# Patient Record
Sex: MALE | Race: BLACK OR AFRICAN AMERICAN | Employment: FULL TIME | URBAN - METROPOLITAN AREA
[De-identification: names, ages, dates, MRNs, and addresses within clinical notes are randomized per-mention and may not be internally consistent; named-entity substitution may affect disease eponyms.]

---

## 2017-06-05 ENCOUNTER — OFFICE VISIT (OUTPATIENT)
Dept: INTERNAL MEDICINE | Age: 57
End: 2017-06-05
Attending: STUDENT IN AN ORGANIZED HEALTH CARE EDUCATION/TRAINING PROGRAM

## 2017-06-05 VITALS
WEIGHT: 193 LBS | BODY MASS INDEX: 26.18 KG/M2 | RESPIRATION RATE: 20 BRPM | TEMPERATURE: 97.6 F | HEART RATE: 66 BPM | DIASTOLIC BLOOD PRESSURE: 112 MMHG | OXYGEN SATURATION: 99 % | SYSTOLIC BLOOD PRESSURE: 181 MMHG

## 2017-06-05 DIAGNOSIS — N18.30 CKD (CHRONIC KIDNEY DISEASE) STAGE 3, GFR 30-59 ML/MIN (HCC): ICD-10-CM

## 2017-06-05 DIAGNOSIS — I10 ESSENTIAL HYPERTENSION: Primary | ICD-10-CM

## 2017-06-05 RX ORDER — AMLODIPINE BESYLATE 5 MG/1
5 TABLET ORAL DAILY
Qty: 30 TABLET | Refills: 2 | Status: SHIPPED | OUTPATIENT
Start: 2017-06-05 | End: 2017-06-26 | Stop reason: SDUPTHER

## 2017-06-06 LAB — TSH REFLEX: 0.81 UIU/ML (ref 0.27–4.2)

## 2017-06-06 ASSESSMENT — ENCOUNTER SYMPTOMS
BLURRED VISION: 0
VOMITING: 0
NAUSEA: 0
SORE THROAT: 0
ORTHOPNEA: 0
SHORTNESS OF BREATH: 0
EYE DISCHARGE: 0
STRIDOR: 0
DIARRHEA: 0
DOUBLE VISION: 0
ABDOMINAL PAIN: 0
COUGH: 0
SPUTUM PRODUCTION: 0
BACK PAIN: 0
PHOTOPHOBIA: 0

## 2017-06-26 ENCOUNTER — OFFICE VISIT (OUTPATIENT)
Dept: INTERNAL MEDICINE | Age: 57
End: 2017-06-26
Attending: STUDENT IN AN ORGANIZED HEALTH CARE EDUCATION/TRAINING PROGRAM

## 2017-06-26 VITALS
TEMPERATURE: 97.9 F | HEART RATE: 85 BPM | RESPIRATION RATE: 20 BRPM | SYSTOLIC BLOOD PRESSURE: 175 MMHG | BODY MASS INDEX: 26.12 KG/M2 | OXYGEN SATURATION: 98 % | DIASTOLIC BLOOD PRESSURE: 104 MMHG | WEIGHT: 192.6 LBS

## 2017-06-26 DIAGNOSIS — N52.9 ERECTILE DYSFUNCTION, UNSPECIFIED ERECTILE DYSFUNCTION TYPE: Primary | ICD-10-CM

## 2017-06-26 RX ORDER — AMLODIPINE BESYLATE 5 MG/1
5 TABLET ORAL 2 TIMES DAILY
Qty: 30 TABLET | Refills: 2 | Status: SHIPPED | OUTPATIENT
Start: 2017-06-26 | End: 2017-06-26 | Stop reason: SDUPTHER

## 2017-06-26 RX ORDER — AMLODIPINE BESYLATE 5 MG/1
5 TABLET ORAL 2 TIMES DAILY
Qty: 60 TABLET | Refills: 2 | Status: SHIPPED | OUTPATIENT
Start: 2017-06-26 | End: 2018-01-08 | Stop reason: SDUPTHER

## 2017-06-26 ASSESSMENT — ENCOUNTER SYMPTOMS
DIARRHEA: 0
SORE THROAT: 0
PHOTOPHOBIA: 0
ORTHOPNEA: 0
COUGH: 0
VOMITING: 0
SPUTUM PRODUCTION: 0
BLURRED VISION: 0
SHORTNESS OF BREATH: 0
ABDOMINAL PAIN: 0
DOUBLE VISION: 0
EYE DISCHARGE: 0
BACK PAIN: 0
STRIDOR: 0
NAUSEA: 0

## 2017-12-22 ENCOUNTER — TELEPHONE (OUTPATIENT)
Dept: INTERNAL MEDICINE | Age: 57
End: 2017-12-22

## 2017-12-22 NOTE — TELEPHONE ENCOUNTER
Pt seen as an er followup in June was supposed to return for another follow up visit in one month but did not. PT was given amlodidpine for htn at that time with 2 refills. Calling wants refill again on amlodipine but has not taken medication since August. Pt offered an appointment in clinic but did not want to make an appointment now states he will call back after the new year. Pt advised to go to an urgent care to have hIs bp checked and get medication if indicated or go to an er if he needed since he did not want to make a clinic appointment. Pt repeated back correrct instructions.

## 2018-01-08 ENCOUNTER — OFFICE VISIT (OUTPATIENT)
Dept: INTERNAL MEDICINE | Age: 58
End: 2018-01-08
Attending: STUDENT IN AN ORGANIZED HEALTH CARE EDUCATION/TRAINING PROGRAM

## 2018-01-08 VITALS
WEIGHT: 200 LBS | HEIGHT: 72 IN | SYSTOLIC BLOOD PRESSURE: 160 MMHG | RESPIRATION RATE: 16 BRPM | TEMPERATURE: 97.3 F | DIASTOLIC BLOOD PRESSURE: 98 MMHG | BODY MASS INDEX: 27.09 KG/M2 | OXYGEN SATURATION: 96 % | HEART RATE: 80 BPM

## 2018-01-08 DIAGNOSIS — I10 HYPERTENSION, UNSPECIFIED TYPE: Primary | ICD-10-CM

## 2018-01-08 RX ORDER — AMLODIPINE BESYLATE 5 MG/1
5 TABLET ORAL 2 TIMES DAILY
Qty: 60 TABLET | Refills: 5 | Status: SHIPPED | OUTPATIENT
Start: 2018-01-08 | End: 2018-02-12 | Stop reason: SDUPTHER

## 2018-01-08 RX ORDER — ACETAMINOPHEN 325 MG/1
650 TABLET ORAL EVERY 6 HOURS PRN
COMMUNITY

## 2018-01-08 ASSESSMENT — ENCOUNTER SYMPTOMS
EYE DISCHARGE: 0
ABDOMINAL PAIN: 0
DIARRHEA: 0
NAUSEA: 0
DOUBLE VISION: 0
ORTHOPNEA: 0
VOMITING: 0
SORE THROAT: 0
COUGH: 0
STRIDOR: 0
SHORTNESS OF BREATH: 0
BACK PAIN: 0
BLURRED VISION: 0
PHOTOPHOBIA: 0
SPUTUM PRODUCTION: 0

## 2018-01-08 NOTE — PROGRESS NOTES
results found for: CHOL, HDL, LDLCALC, TRIG    U/A:No results found for: LABMICR, [id number]    Imaging:   No results found. Assessment/Plan:   The patient is a 56 y.o. male with no significant PMHx who presents for a follow up visit after being seen in the ED on 6/2/17 ( his 3rd visit today). HTN. Patient seen in the ED on 6/2 and started on Coreg 6.25 BID. During following clinic visit patient found to have BP of 181/112. EKG was ordered (6/5) did not demonstrate any clear LVH or acute ST/T wave changes (patient was bradycardic likely due to BB). TSH was within normal limits ( 0.81). He was started on Norvasc 5mg daily (stopped coreg). During next visit (6/27) patient blood pressure noted to be 175/104, with HR of 85. Patient was asymptomatic.     - Was prescribed Norvac to 5mg BID PO daily last visit in June but never followed up. Patient was found to have elevated creatinine so at the time held off on ACE/thiazide until a repeat renal panel was obtained. Unfortunately patient only returned today. Reports BP at home was much improved. In clinic his sBP was 160/98. - Will refill Norvasc as patient reports it works for him, patient informed he should find a PCP who is convenient for him. Informed he should have BP monitored. He was also informed he should have a renal panel to monitor renal function.   - Low salt diet advised  - Again encouraged to measure and record blood pressure at home     Suspected CKD, stage 3. Patient found to have Cr of 1.5 on 6/2/17 in ED. BUN was 12. Likely related to hypertension. JANINE also possible but less likely with normal BUN and patient reporting no change in PO intake. - Repeat renal panel ordered, but patient did not have lab work. - Avoiding nephrotoxic medication  - BP control   - Asked to have renal panel checked with new PCP. Erectile dysfunction. Resolved. - Patient reports this is no longer an issue    Overweight.  BMI of 27.2  -Again recommended patient increase exercise activity and avoid junk food      Tobacco use disorder. Reports 1 PPD for 10-11 years.   - Recommended patient stop smoking as it may exacerbate his medical problems. Patient did not express a willingness to quite at this time. Neck Pain. Resolved. Was likely MSK in nature. Patient was informed he will need to find a new primary care physician who he would be able to see on a regular basis and have blood pressure and renal function carefully monitored. If any concerns patient was asked to go to ED. Case will be discussed with preceptor.      Shanta Douglas MD  Internal Medicine Resident  Pager: [phone]  1/8/2018, 10:49 AM

## 2018-01-08 NOTE — PATIENT INSTRUCTIONS
Call your pharmacy for medication refills at least 48 hours ahead at [phone] (Monday -Friday, 08:00 AM to 4:00 PM .If you become ill when the clinic is closed, please call [facility]  at [phone] and ask the  to page the AOD between 6:00 AM and 6:00 PM or page the AON between 6:00 PM & 6:00 AM.    The clinic is not able to process [facility] requests for appointments or messages including refill requests. Please call the [facility] at [phone] for appointments and messages. The [facility]  Telephone:[phone]    Refill Amlodipine 5 mg take one tablet by mouth 2 times per day. Refills x5. Call your insurance for referral for Private primary care doctor. Instructions reviewed with patient and copy given to patient upon discharge.      10:41 AM1/8/2018

## 2018-02-12 RX ORDER — AMLODIPINE BESYLATE 5 MG/1
5 TABLET ORAL 2 TIMES DAILY
Qty: 60 TABLET | Refills: 2 | Status: SHIPPED | OUTPATIENT
Start: 2018-02-12 | End: 2018-10-08 | Stop reason: SDUPTHER

## 2018-10-08 ENCOUNTER — OFFICE VISIT (OUTPATIENT)
Dept: INTERNAL MEDICINE CLINIC | Age: 58
End: 2018-10-08

## 2018-10-08 VITALS
TEMPERATURE: 96.9 F | HEIGHT: 72 IN | BODY MASS INDEX: 28.01 KG/M2 | RESPIRATION RATE: 20 BRPM | HEART RATE: 67 BPM | SYSTOLIC BLOOD PRESSURE: 152 MMHG | OXYGEN SATURATION: 99 % | DIASTOLIC BLOOD PRESSURE: 99 MMHG | WEIGHT: 206.8 LBS

## 2018-10-08 DIAGNOSIS — I10 HYPERTENSION, UNSPECIFIED TYPE: Primary | ICD-10-CM

## 2018-10-08 PROCEDURE — 99213 OFFICE O/P EST LOW 20 MIN: CPT | Performed by: STUDENT IN AN ORGANIZED HEALTH CARE EDUCATION/TRAINING PROGRAM

## 2018-10-08 RX ORDER — AMLODIPINE BESYLATE 5 MG/1
5 TABLET ORAL 2 TIMES DAILY
Qty: 60 TABLET | Refills: 2 | Status: ON HOLD | OUTPATIENT
Start: 2018-10-08 | End: 2018-12-08 | Stop reason: HOSPADM

## 2018-10-08 ASSESSMENT — ENCOUNTER SYMPTOMS
ABDOMINAL PAIN: 0
SORE THROAT: 0
DIARRHEA: 0
BACK PAIN: 0
PHOTOPHOBIA: 0
EYE DISCHARGE: 0
COUGH: 0
DOUBLE VISION: 0
STRIDOR: 0
BLURRED VISION: 0
VOMITING: 0
NAUSEA: 0
ORTHOPNEA: 0
SHORTNESS OF BREATH: 0
SPUTUM PRODUCTION: 0

## 2018-10-08 NOTE — PROGRESS NOTES
normal limits ( 0.81). He was started on Norvasc 5mg daily (stopped coreg). During next visit (6/27) patient blood pressure noted to be 175/104, with HR of 85. Patient was asymptomatic. Norvasc dose was increased (it was felt BID dosing might be better than single day dosing for patient). Was prescribed Norvac to 5mg BID PO. At the time held off on ACE/thiazide until a repeat renal panel was obtained. Unfortunately patient did not obtain renal panel.   - Will refill Norvasc, patient informed he should find a PCP who is convenient for him. Again informed he should have BP monitored. He was also informed he should have a renal panel to monitor renal function.   - Again low salt diet advised  - Again encouraged to measure and record blood pressure at home     Suspected CKD, stage 3. Patient found to have Cr of 1.5 on 6/2/17 in ED. BUN was 12. Likely related to hypertension. JANINE also possible but less likely with normal BUN and patient reporting no change in PO intake. - Repeat renal panel ordered, but patient did not have lab work. - Avoiding nephrotoxic medication  - BP control   - Asked to have renal panel checked with new PCP. Erectile dysfunction. Resolved. - Patient again reports this is no longer an issue    Overweight. BMI of 28  -Again recommended patient increase exercise activity and avoid junk food      Tobacco use disorder. Reports 1 PPD for 10-11 years.   - Recommended patient stop smoking as it may exacerbate his medical problems. Patient again did not express a willingness to quite at this time. Neck Pain. Resolved. It was reiterated he will need to find a new primary care physician who he would be able to see on a regular basis and have blood pressure and renal function carefully monitored. If any concerns patient was asked to go to ED. Patient reported he will get flu vaccine at work. Case will be discussed with preceptor.      Eldon Boeck, MD  Internal Medicine

## 2018-12-06 ENCOUNTER — APPOINTMENT (OUTPATIENT)
Dept: GENERAL RADIOLOGY | Age: 58
DRG: 251 | End: 2018-12-06
Payer: COMMERCIAL

## 2018-12-06 ENCOUNTER — HOSPITAL ENCOUNTER (INPATIENT)
Age: 58
LOS: 1 days | Discharge: HOME | DRG: 251 | End: 2018-12-08
Attending: EMERGENCY MEDICINE | Admitting: INTERNAL MEDICINE
Payer: COMMERCIAL

## 2018-12-06 DIAGNOSIS — I21.4 NSTEMI (NON-ST ELEVATED MYOCARDIAL INFARCTION) (HCC): ICD-10-CM

## 2018-12-06 DIAGNOSIS — R07.9 CHEST PAIN, UNSPECIFIED TYPE: Primary | ICD-10-CM

## 2018-12-06 LAB
BASOPHILS ABSOLUTE: 0 K/UL (ref 0–0.2)
BASOPHILS RELATIVE PERCENT: 0.4 %
CALCIUM IONIZED: 1.1 MMOL/L (ref 1.12–1.32)
CO2: 26 MMOL/L (ref 21–32)
EOSINOPHILS ABSOLUTE: 0 K/UL (ref 0–0.6)
EOSINOPHILS RELATIVE PERCENT: 0.3 %
GFR AFRICAN AMERICAN: 54
GFR NON-AFRICAN AMERICAN: 45
GLUCOSE BLD-MCNC: 180 MG/DL (ref 70–99)
HCT VFR BLD CALC: 49 % (ref 40.5–52.5)
HEMOGLOBIN: 16.4 G/DL (ref 13.5–17.5)
LYMPHOCYTES ABSOLUTE: 2.2 K/UL (ref 1–5.1)
LYMPHOCYTES RELATIVE PERCENT: 19.1 %
MCH RBC QN AUTO: 31.9 PG (ref 26–34)
MCHC RBC AUTO-ENTMCNC: 33.5 G/DL (ref 31–36)
MCV RBC AUTO: 95 FL (ref 80–100)
MONOCYTES ABSOLUTE: 0.5 K/UL (ref 0–1.3)
MONOCYTES RELATIVE PERCENT: 4.3 %
NEUTROPHILS ABSOLUTE: 8.8 K/UL (ref 1.7–7.7)
NEUTROPHILS RELATIVE PERCENT: 75.9 %
PDW BLD-RTO: 13.9 % (ref 12.4–15.4)
PERFORMED ON: ABNORMAL
PERFORMED ON: NORMAL
PLATELET # BLD: 277 K/UL (ref 135–450)
PMV BLD AUTO: 8.3 FL (ref 5–10.5)
POC ANION GAP: 13 (ref 10–20)
POC BUN: 18 MG/DL (ref 7–18)
POC CHLORIDE: 101 MMOL/L (ref 99–110)
POC CREATININE: 1.6 MG/DL (ref 0.9–1.3)
POC POTASSIUM: 3.6 MMOL/L (ref 3.5–5.1)
POC SAMPLE TYPE: ABNORMAL
POC SAMPLE TYPE: NORMAL
POC SODIUM: 140 MMOL/L (ref 136–145)
POC TROPONIN I: 0 NG/ML (ref 0–0.1)
RBC # BLD: 5.16 M/UL (ref 4.2–5.9)
TROPONIN: 0.08 NG/ML
WBC # BLD: 11.6 K/UL (ref 4–11)

## 2018-12-06 PROCEDURE — 80047 BASIC METABLC PNL IONIZED CA: CPT

## 2018-12-06 PROCEDURE — 84484 ASSAY OF TROPONIN QUANT: CPT

## 2018-12-06 PROCEDURE — 93005 ELECTROCARDIOGRAM TRACING: CPT | Performed by: EMERGENCY MEDICINE

## 2018-12-06 PROCEDURE — 96360 HYDRATION IV INFUSION INIT: CPT

## 2018-12-06 PROCEDURE — 85025 COMPLETE CBC W/AUTO DIFF WBC: CPT

## 2018-12-06 PROCEDURE — 71046 X-RAY EXAM CHEST 2 VIEWS: CPT

## 2018-12-06 PROCEDURE — 99285 EMERGENCY DEPT VISIT HI MDM: CPT

## 2018-12-06 PROCEDURE — 2580000003 HC RX 258: Performed by: EMERGENCY MEDICINE

## 2018-12-06 PROCEDURE — 6370000000 HC RX 637 (ALT 250 FOR IP): Performed by: EMERGENCY MEDICINE

## 2018-12-06 RX ORDER — AZITHROMYCIN 250 MG/1
500 TABLET, FILM COATED ORAL ONCE
Status: COMPLETED | OUTPATIENT
Start: 2018-12-06 | End: 2018-12-06

## 2018-12-06 RX ORDER — ACETAMINOPHEN 325 MG/1
650 TABLET ORAL EVERY 4 HOURS PRN
Status: DISCONTINUED | OUTPATIENT
Start: 2018-12-06 | End: 2018-12-07 | Stop reason: SDUPTHER

## 2018-12-06 RX ORDER — ASPIRIN 81 MG/1
324 TABLET, CHEWABLE ORAL ONCE
Status: DISCONTINUED | OUTPATIENT
Start: 2018-12-06 | End: 2018-12-07

## 2018-12-06 RX ORDER — ASPIRIN 81 MG/1
81 TABLET, CHEWABLE ORAL DAILY
Status: DISCONTINUED | OUTPATIENT
Start: 2018-12-07 | End: 2018-12-07 | Stop reason: SDUPTHER

## 2018-12-06 RX ORDER — 0.9 % SODIUM CHLORIDE 0.9 %
1000 INTRAVENOUS SOLUTION INTRAVENOUS ONCE
Status: COMPLETED | OUTPATIENT
Start: 2018-12-06 | End: 2018-12-06

## 2018-12-06 RX ADMIN — SODIUM CHLORIDE 1000 ML: 9 INJECTION, SOLUTION INTRAVENOUS at 21:53

## 2018-12-06 RX ADMIN — LIDOCAINE HYDROCHLORIDE: 20 SOLUTION ORAL; TOPICAL at 20:13

## 2018-12-06 RX ADMIN — AZITHROMYCIN 500 MG: 250 TABLET, FILM COATED ORAL at 21:57

## 2018-12-06 ASSESSMENT — PAIN DESCRIPTION - ONSET: ONSET: SUDDEN

## 2018-12-06 ASSESSMENT — PAIN DESCRIPTION - ORIENTATION: ORIENTATION: MID;UPPER

## 2018-12-06 ASSESSMENT — ENCOUNTER SYMPTOMS
NAUSEA: 0
ABDOMINAL PAIN: 0
SHORTNESS OF BREATH: 0
VOMITING: 0
COUGH: 0
NAUSEA: 1
DIARRHEA: 0
COUGH: 1

## 2018-12-06 ASSESSMENT — PAIN DESCRIPTION - LOCATION: LOCATION: CHEST

## 2018-12-06 ASSESSMENT — PAIN DESCRIPTION - DESCRIPTORS: DESCRIPTORS: HEAVINESS;PRESSURE

## 2018-12-06 ASSESSMENT — PAIN SCALES - WONG BAKER: WONGBAKER_NUMERICALRESPONSE: 6

## 2018-12-06 ASSESSMENT — PAIN SCALES - GENERAL: PAINLEVEL_OUTOF10: 5

## 2018-12-06 ASSESSMENT — PAIN DESCRIPTION - PAIN TYPE: TYPE: ACUTE PAIN

## 2018-12-06 ASSESSMENT — HEART SCORE: ECG: 0

## 2018-12-06 ASSESSMENT — PAIN DESCRIPTION - PROGRESSION: CLINICAL_PROGRESSION: GRADUALLY IMPROVING

## 2018-12-07 ENCOUNTER — APPOINTMENT (OUTPATIENT)
Dept: CARDIAC CATH/INVASIVE PROCEDURES | Age: 58
DRG: 251 | End: 2018-12-07
Payer: COMMERCIAL

## 2018-12-07 ENCOUNTER — APPOINTMENT (OUTPATIENT)
Dept: GENERAL RADIOLOGY | Age: 58
DRG: 251 | End: 2018-12-07
Payer: COMMERCIAL

## 2018-12-07 PROBLEM — I21.4 NSTEMI (NON-ST ELEVATED MYOCARDIAL INFARCTION) (HCC): Status: ACTIVE | Noted: 2018-12-07

## 2018-12-07 LAB
ANION GAP SERPL CALCULATED.3IONS-SCNC: 14 MMOL/L (ref 3–16)
APTT: 189.1 SEC (ref 26–36)
BUN BLDV-MCNC: 14 MG/DL (ref 7–20)
CALCIUM SERPL-MCNC: 9.2 MG/DL (ref 8.3–10.6)
CHLORIDE BLD-SCNC: 101 MMOL/L (ref 99–110)
CO2: 21 MMOL/L (ref 21–32)
CREAT SERPL-MCNC: 1.3 MG/DL (ref 0.9–1.3)
EKG ATRIAL RATE: 72 BPM
EKG ATRIAL RATE: 80 BPM
EKG ATRIAL RATE: 83 BPM
EKG DIAGNOSIS: NORMAL
EKG P AXIS: 46 DEGREES
EKG P AXIS: 70 DEGREES
EKG P AXIS: 74 DEGREES
EKG P-R INTERVAL: 146 MS
EKG P-R INTERVAL: 154 MS
EKG P-R INTERVAL: 156 MS
EKG Q-T INTERVAL: 380 MS
EKG Q-T INTERVAL: 384 MS
EKG Q-T INTERVAL: 386 MS
EKG QRS DURATION: 100 MS
EKG QRS DURATION: 108 MS
EKG QRS DURATION: 94 MS
EKG QTC CALCULATION (BAZETT): 422 MS
EKG QTC CALCULATION (BAZETT): 438 MS
EKG QTC CALCULATION (BAZETT): 451 MS
EKG R AXIS: 51 DEGREES
EKG R AXIS: 74 DEGREES
EKG R AXIS: 77 DEGREES
EKG T AXIS: 10 DEGREES
EKG T AXIS: 41 DEGREES
EKG T AXIS: 44 DEGREES
EKG VENTRICULAR RATE: 72 BPM
EKG VENTRICULAR RATE: 80 BPM
EKG VENTRICULAR RATE: 83 BPM
GFR AFRICAN AMERICAN: >60
GFR NON-AFRICAN AMERICAN: 57
GLUCOSE BLD-MCNC: 116 MG/DL (ref 70–99)
HCT VFR BLD CALC: 48.6 % (ref 40.5–52.5)
HEMOGLOBIN: 16.3 G/DL (ref 13.5–17.5)
INR BLD: 0.97 (ref 0.86–1.14)
LEFT VENTRICULAR EJECTION FRACTION HIGH VALUE: 55 %
LEFT VENTRICULAR EJECTION FRACTION MODE: NORMAL
MCH RBC QN AUTO: 31.7 PG (ref 26–34)
MCHC RBC AUTO-ENTMCNC: 33.6 G/DL (ref 31–36)
MCV RBC AUTO: 94.2 FL (ref 80–100)
PDW BLD-RTO: 13.6 % (ref 12.4–15.4)
PLATELET # BLD: 271 K/UL (ref 135–450)
PMV BLD AUTO: 7.8 FL (ref 5–10.5)
POC ACT LR: 163 SEC (ref 113–149)
POC ACT LR: 206 SEC (ref 113–149)
POC ACT LR: 230 SEC (ref 113–149)
POC ACT LR: 271 SEC (ref 113–149)
POTASSIUM SERPL-SCNC: 4.1 MMOL/L (ref 3.5–5.1)
PROTHROMBIN TIME: 11.1 SEC (ref 9.8–13)
RBC # BLD: 5.16 M/UL (ref 4.2–5.9)
SODIUM BLD-SCNC: 136 MMOL/L (ref 136–145)
TROPONIN: 2.24 NG/ML
WBC # BLD: 11.3 K/UL (ref 4–11)

## 2018-12-07 PROCEDURE — 85347 COAGULATION TIME ACTIVATED: CPT

## 2018-12-07 PROCEDURE — 84484 ASSAY OF TROPONIN QUANT: CPT

## 2018-12-07 PROCEDURE — 02703ZZ DILATION OF CORONARY ARTERY, ONE ARTERY, PERCUTANEOUS APPROACH: ICD-10-PCS | Performed by: INTERNAL MEDICINE

## 2018-12-07 PROCEDURE — 85610 PROTHROMBIN TIME: CPT

## 2018-12-07 PROCEDURE — 2580000003 HC RX 258: Performed by: INTERNAL MEDICINE

## 2018-12-07 PROCEDURE — 2709999900 HC NON-CHARGEABLE SUPPLY

## 2018-12-07 PROCEDURE — B2151ZZ FLUOROSCOPY OF LEFT HEART USING LOW OSMOLAR CONTRAST: ICD-10-PCS | Performed by: INTERNAL MEDICINE

## 2018-12-07 PROCEDURE — 93308 TTE F-UP OR LMTD: CPT

## 2018-12-07 PROCEDURE — C1725 CATH, TRANSLUMIN NON-LASER: HCPCS

## 2018-12-07 PROCEDURE — 93458 L HRT ARTERY/VENTRICLE ANGIO: CPT

## 2018-12-07 PROCEDURE — C1760 CLOSURE DEV, VASC: HCPCS

## 2018-12-07 PROCEDURE — C1894 INTRO/SHEATH, NON-LASER: HCPCS

## 2018-12-07 PROCEDURE — 85730 THROMBOPLASTIN TIME PARTIAL: CPT

## 2018-12-07 PROCEDURE — 93005 ELECTROCARDIOGRAM TRACING: CPT | Performed by: EMERGENCY MEDICINE

## 2018-12-07 PROCEDURE — 92920 PRQ TRLUML C ANGIOP 1ART&/BR: CPT | Performed by: INTERNAL MEDICINE

## 2018-12-07 PROCEDURE — 99153 MOD SED SAME PHYS/QHP EA: CPT

## 2018-12-07 PROCEDURE — 6370000000 HC RX 637 (ALT 250 FOR IP): Performed by: FAMILY MEDICINE

## 2018-12-07 PROCEDURE — 6370000000 HC RX 637 (ALT 250 FOR IP)

## 2018-12-07 PROCEDURE — 6370000000 HC RX 637 (ALT 250 FOR IP): Performed by: INTERNAL MEDICINE

## 2018-12-07 PROCEDURE — 2580000003 HC RX 258: Performed by: FAMILY MEDICINE

## 2018-12-07 PROCEDURE — 80048 BASIC METABOLIC PNL TOTAL CA: CPT

## 2018-12-07 PROCEDURE — 71045 X-RAY EXAM CHEST 1 VIEW: CPT

## 2018-12-07 PROCEDURE — 6360000004 HC RX CONTRAST MEDICATION: Performed by: INTERNAL MEDICINE

## 2018-12-07 PROCEDURE — 93458 L HRT ARTERY/VENTRICLE ANGIO: CPT | Performed by: INTERNAL MEDICINE

## 2018-12-07 PROCEDURE — 99152 MOD SED SAME PHYS/QHP 5/>YRS: CPT

## 2018-12-07 PROCEDURE — 92921 HC PRQ CARDIAC ANGIO ADDL ART: CPT

## 2018-12-07 PROCEDURE — 4A023N7 MEASUREMENT OF CARDIAC SAMPLING AND PRESSURE, LEFT HEART, PERCUTANEOUS APPROACH: ICD-10-PCS | Performed by: INTERNAL MEDICINE

## 2018-12-07 PROCEDURE — 6360000002 HC RX W HCPCS

## 2018-12-07 PROCEDURE — B2111ZZ FLUOROSCOPY OF MULTIPLE CORONARY ARTERIES USING LOW OSMOLAR CONTRAST: ICD-10-PCS | Performed by: INTERNAL MEDICINE

## 2018-12-07 PROCEDURE — 99254 IP/OBS CNSLTJ NEW/EST MOD 60: CPT | Performed by: INTERNAL MEDICINE

## 2018-12-07 PROCEDURE — 2500000003 HC RX 250 WO HCPCS

## 2018-12-07 PROCEDURE — 6360000002 HC RX W HCPCS: Performed by: EMERGENCY MEDICINE

## 2018-12-07 PROCEDURE — 85027 COMPLETE CBC AUTOMATED: CPT

## 2018-12-07 PROCEDURE — 99152 MOD SED SAME PHYS/QHP 5/>YRS: CPT | Performed by: INTERNAL MEDICINE

## 2018-12-07 PROCEDURE — 92920 PRQ TRLUML C ANGIOP 1ART&/BR: CPT

## 2018-12-07 PROCEDURE — 2000000000 HC ICU R&B

## 2018-12-07 PROCEDURE — C1769 GUIDE WIRE: HCPCS

## 2018-12-07 PROCEDURE — C1887 CATHETER, GUIDING: HCPCS

## 2018-12-07 RX ORDER — ASPIRIN 81 MG/1
81 TABLET ORAL DAILY
Status: DISCONTINUED | OUTPATIENT
Start: 2018-12-08 | End: 2018-12-08 | Stop reason: HOSPADM

## 2018-12-07 RX ORDER — HEPARIN SODIUM 10000 [USP'U]/100ML
11 INJECTION, SOLUTION INTRAVENOUS CONTINUOUS
Status: DISCONTINUED | OUTPATIENT
Start: 2018-12-07 | End: 2018-12-07

## 2018-12-07 RX ORDER — FUROSEMIDE 10 MG/ML
10 INJECTION INTRAMUSCULAR; INTRAVENOUS ONCE
Status: COMPLETED | OUTPATIENT
Start: 2018-12-08 | End: 2018-12-08

## 2018-12-07 RX ORDER — ACETAMINOPHEN 325 MG/1
650 TABLET ORAL EVERY 4 HOURS PRN
Status: DISCONTINUED | OUTPATIENT
Start: 2018-12-07 | End: 2018-12-08 | Stop reason: HOSPADM

## 2018-12-07 RX ORDER — HEPARIN SODIUM 5000 [USP'U]/ML
4000 INJECTION, SOLUTION INTRAVENOUS; SUBCUTANEOUS ONCE
Status: COMPLETED | OUTPATIENT
Start: 2018-12-07 | End: 2018-12-07

## 2018-12-07 RX ORDER — ONDANSETRON 2 MG/ML
4 INJECTION INTRAMUSCULAR; INTRAVENOUS EVERY 6 HOURS PRN
Status: DISCONTINUED | OUTPATIENT
Start: 2018-12-07 | End: 2018-12-07 | Stop reason: SDUPTHER

## 2018-12-07 RX ORDER — HEPARIN SODIUM 5000 [USP'U]/ML
4000 INJECTION, SOLUTION INTRAVENOUS; SUBCUTANEOUS PRN
Status: DISCONTINUED | OUTPATIENT
Start: 2018-12-07 | End: 2018-12-07 | Stop reason: ALTCHOICE

## 2018-12-07 RX ORDER — LOSARTAN POTASSIUM 25 MG/1
25 TABLET ORAL DAILY
Status: DISCONTINUED | OUTPATIENT
Start: 2018-12-08 | End: 2018-12-08 | Stop reason: HOSPADM

## 2018-12-07 RX ORDER — METOPROLOL SUCCINATE 25 MG/1
25 TABLET, EXTENDED RELEASE ORAL DAILY
Status: DISCONTINUED | OUTPATIENT
Start: 2018-12-07 | End: 2018-12-08 | Stop reason: HOSPADM

## 2018-12-07 RX ORDER — NITROGLYCERIN 0.4 MG/1
0.4 TABLET SUBLINGUAL EVERY 5 MIN PRN
Status: DISCONTINUED | OUTPATIENT
Start: 2018-12-07 | End: 2018-12-08 | Stop reason: HOSPADM

## 2018-12-07 RX ORDER — NICOTINE 21 MG/24HR
1 PATCH, TRANSDERMAL 24 HOURS TRANSDERMAL DAILY
Status: DISCONTINUED | OUTPATIENT
Start: 2018-12-07 | End: 2018-12-08 | Stop reason: HOSPADM

## 2018-12-07 RX ORDER — ATROPINE SULFATE 0.4 MG/ML
0.5 AMPUL (ML) INJECTION
Status: ACTIVE | OUTPATIENT
Start: 2018-12-07 | End: 2018-12-07

## 2018-12-07 RX ORDER — SODIUM CHLORIDE 0.9 % (FLUSH) 0.9 %
10 SYRINGE (ML) INJECTION EVERY 12 HOURS SCHEDULED
Status: DISCONTINUED | OUTPATIENT
Start: 2018-12-07 | End: 2018-12-07 | Stop reason: SDUPTHER

## 2018-12-07 RX ORDER — CLOPIDOGREL BISULFATE 75 MG/1
75 TABLET ORAL DAILY
Status: DISCONTINUED | OUTPATIENT
Start: 2018-12-08 | End: 2018-12-08 | Stop reason: HOSPADM

## 2018-12-07 RX ORDER — SODIUM CHLORIDE 9 MG/ML
INJECTION, SOLUTION INTRAVENOUS CONTINUOUS
Status: DISPENSED | OUTPATIENT
Start: 2018-12-07 | End: 2018-12-07

## 2018-12-07 RX ORDER — SODIUM CHLORIDE 0.9 % (FLUSH) 0.9 %
10 SYRINGE (ML) INJECTION PRN
Status: DISCONTINUED | OUTPATIENT
Start: 2018-12-07 | End: 2018-12-07 | Stop reason: SDUPTHER

## 2018-12-07 RX ORDER — FAMOTIDINE 20 MG/1
20 TABLET, FILM COATED ORAL 2 TIMES DAILY
Status: DISCONTINUED | OUTPATIENT
Start: 2018-12-07 | End: 2018-12-08 | Stop reason: HOSPADM

## 2018-12-07 RX ORDER — SODIUM CHLORIDE 0.9 % (FLUSH) 0.9 %
10 SYRINGE (ML) INJECTION PRN
Status: DISCONTINUED | OUTPATIENT
Start: 2018-12-07 | End: 2018-12-08 | Stop reason: HOSPADM

## 2018-12-07 RX ORDER — HEPARIN SODIUM 5000 [USP'U]/ML
2000 INJECTION, SOLUTION INTRAVENOUS; SUBCUTANEOUS PRN
Status: DISCONTINUED | OUTPATIENT
Start: 2018-12-07 | End: 2018-12-07 | Stop reason: ALTCHOICE

## 2018-12-07 RX ORDER — ATORVASTATIN CALCIUM 40 MG/1
40 TABLET, FILM COATED ORAL NIGHTLY
Status: DISCONTINUED | OUTPATIENT
Start: 2018-12-07 | End: 2018-12-08 | Stop reason: HOSPADM

## 2018-12-07 RX ORDER — SODIUM CHLORIDE 0.9 % (FLUSH) 0.9 %
10 SYRINGE (ML) INJECTION EVERY 12 HOURS SCHEDULED
Status: DISCONTINUED | OUTPATIENT
Start: 2018-12-07 | End: 2018-12-08 | Stop reason: HOSPADM

## 2018-12-07 RX ORDER — ATORVASTATIN CALCIUM 40 MG/1
40 TABLET, FILM COATED ORAL NIGHTLY
Status: DISCONTINUED | OUTPATIENT
Start: 2018-12-07 | End: 2018-12-07 | Stop reason: SDUPTHER

## 2018-12-07 RX ORDER — CARVEDILOL 3.12 MG/1
3.12 TABLET ORAL 2 TIMES DAILY WITH MEALS
Status: DISCONTINUED | OUTPATIENT
Start: 2018-12-07 | End: 2018-12-07

## 2018-12-07 RX ORDER — MORPHINE SULFATE 2 MG/ML
2 INJECTION, SOLUTION INTRAMUSCULAR; INTRAVENOUS
Status: ACTIVE | OUTPATIENT
Start: 2018-12-07 | End: 2018-12-07

## 2018-12-07 RX ORDER — ONDANSETRON 2 MG/ML
4 INJECTION INTRAMUSCULAR; INTRAVENOUS EVERY 6 HOURS PRN
Status: DISCONTINUED | OUTPATIENT
Start: 2018-12-07 | End: 2018-12-08 | Stop reason: HOSPADM

## 2018-12-07 RX ADMIN — IOHEXOL 300 ML: 350 INJECTION, SOLUTION INTRAVENOUS at 11:08

## 2018-12-07 RX ADMIN — FAMOTIDINE 20 MG: 20 TABLET ORAL at 13:47

## 2018-12-07 RX ADMIN — SODIUM CHLORIDE: 9 INJECTION, SOLUTION INTRAVENOUS at 12:00

## 2018-12-07 RX ADMIN — NITROGLYCERIN 0.5 INCH: 20 OINTMENT TOPICAL at 17:49

## 2018-12-07 RX ADMIN — METOPROLOL SUCCINATE 25 MG: 25 TABLET, EXTENDED RELEASE ORAL at 14:38

## 2018-12-07 RX ADMIN — NITROGLYCERIN 0.5 INCH: 20 OINTMENT TOPICAL at 13:47

## 2018-12-07 RX ADMIN — Medication 10 ML: at 20:25

## 2018-12-07 RX ADMIN — FAMOTIDINE 20 MG: 20 TABLET ORAL at 20:24

## 2018-12-07 RX ADMIN — ACETAMINOPHEN 650 MG: 325 TABLET ORAL at 20:24

## 2018-12-07 RX ADMIN — HEPARIN SODIUM 4000 UNITS: 5000 INJECTION INTRAVENOUS; SUBCUTANEOUS at 06:20

## 2018-12-07 RX ADMIN — SODIUM CHLORIDE: 9 INJECTION, SOLUTION INTRAVENOUS at 15:51

## 2018-12-07 RX ADMIN — ACETAMINOPHEN 650 MG: 325 TABLET ORAL at 16:53

## 2018-12-07 RX ADMIN — HEPARIN SODIUM AND DEXTROSE 11 UNITS/KG/HR: 10000; 5 INJECTION INTRAVENOUS at 06:21

## 2018-12-07 RX ADMIN — ATORVASTATIN CALCIUM 40 MG: 40 TABLET, FILM COATED ORAL at 20:24

## 2018-12-07 ASSESSMENT — PAIN DESCRIPTION - FREQUENCY
FREQUENCY: CONTINUOUS
FREQUENCY: CONTINUOUS
FREQUENCY: INTERMITTENT

## 2018-12-07 ASSESSMENT — PAIN DESCRIPTION - ONSET
ONSET: ON-GOING
ONSET: UNABLE TO TELL
ONSET: PROGRESSIVE

## 2018-12-07 ASSESSMENT — PAIN SCALES - GENERAL
PAINLEVEL_OUTOF10: 4
PAINLEVEL_OUTOF10: 0
PAINLEVEL_OUTOF10: 4
PAINLEVEL_OUTOF10: 2
PAINLEVEL_OUTOF10: 0
PAINLEVEL_OUTOF10: 7

## 2018-12-07 ASSESSMENT — PAIN DESCRIPTION - PROGRESSION
CLINICAL_PROGRESSION: NOT CHANGED
CLINICAL_PROGRESSION: GRADUALLY WORSENING
CLINICAL_PROGRESSION: NOT CHANGED

## 2018-12-07 ASSESSMENT — PAIN DESCRIPTION - DESCRIPTORS
DESCRIPTORS: DISCOMFORT
DESCRIPTORS: ACHING;DISCOMFORT
DESCRIPTORS: ACHING;DISCOMFORT

## 2018-12-07 ASSESSMENT — PAIN DESCRIPTION - ORIENTATION
ORIENTATION: POSTERIOR
ORIENTATION: RIGHT;LOWER
ORIENTATION: POSTERIOR

## 2018-12-07 ASSESSMENT — PAIN DESCRIPTION - PAIN TYPE
TYPE: ACUTE PAIN

## 2018-12-07 ASSESSMENT — PAIN DESCRIPTION - LOCATION
LOCATION: HEAD
LOCATION: CHEST
LOCATION: HEAD

## 2018-12-07 NOTE — PROCEDURES
bolus.  I did use 300 mL of  contrast during the procedure. PLAN:  Hydration. Bedrest.  Angio-Seal was successfully deployed in the  right femoral arteriotomy. 600 of Plavix was given, 25 mg of Benadryl  was given and 4 mg Zofran was given. Risk factor modification. Beta  blockers and ARB administration.         Seb Dickinson MD    D: 12/07/2018 11:35:09       T: 12/07/2018 12:40:29     [id number]  Job#: [id number]     Doc#: [id number]    CC:

## 2018-12-07 NOTE — ED PROVIDER NOTES
file.  Social History   Substance Use Topics    Smoking status: Current Every Day Smoker     Packs/day: 1.00    Smokeless tobacco: Never Used    Alcohol use Yes      Comment: weekends         Medications      Previous Medications    ACETAMINOPHEN (TYLENOL) 325 MG TABLET    Take 650 mg by mouth every 6 hours as needed for Pain    AMLODIPINE (NORVASC) 5 MG TABLET    Take 1 tablet by mouth 2 times daily          Review of Systems      Review of Systems   Constitutional: Negative for chills and fever. Respiratory: Positive for cough. Negative for shortness of breath. Cardiovascular: Positive for chest pain. Negative for palpitations. Gastrointestinal: Negative for nausea and vomiting. All other systems reviewed and are negative. Physical Examination      Physical Exam   Constitutional: He appears well-developed and well-nourished. No distress. Cardiovascular: Normal rate and regular rhythm. Pulmonary/Chest: Effort normal and breath sounds normal. He exhibits no tenderness. Abdominal: There is no tenderness. Musculoskeletal: He exhibits no edema. Skin: He is not diaphoretic.           Byron Atkinson MD  12/06/18 1048

## 2018-12-07 NOTE — ED PROVIDER NOTES
oriented to person, place, and time. He appears well-developed and well-nourished. No distress. Cardiovascular: Normal rate and regular rhythm. Pulmonary/Chest: Effort normal and breath sounds normal.   Abdominal: Soft. Bowel sounds are normal. He exhibits no distension. There is no tenderness. Musculoskeletal: He exhibits no edema. Neurological: He is alert and oriented to person, place, and time. Skin: Skin is warm and dry. He is not diaphoretic. Psychiatric: He has a normal mood and affect. His behavior is normal.   Nursing note and vitals reviewed. Left thumb shows a slight tenderness and some bleeding from underneath the nail. There is no subungual hematoma. Diagnostic Results     EKG   Interpreted by Josie Angelo MD     Rhythm: normal sinus   Rate: normal  Axis: normal  Ectopy: none  Conduction: normal  ST Segments: no acute change  T Waves: no acute change  Q Waves: none    Clinical Impression: normal sinus rhythm with no acute changes/normal EKG      RADIOLOGY:  XR CHEST STANDARD (2 VW)   Final Result      1. Mild bibasilar airspace disease, right greater than left.                 LABS:   Results for orders placed or performed during the hospital encounter of 12/06/18   CBC Auto Differential   Result Value Ref Range    WBC 11.6 (H) 4.0 - 11.0 K/uL    RBC 5.16 4.20 - 5.90 M/uL    Hemoglobin 16.4 13.5 - 17.5 g/dL    Hematocrit 49.0 40.5 - 52.5 %    MCV 95.0 80.0 - 100.0 fL    MCH 31.9 26.0 - 34.0 pg    MCHC 33.5 31.0 - 36.0 g/dL    RDW 13.9 12.4 - 15.4 %    Platelets 103 852 - 129 K/uL    MPV 8.3 5.0 - 10.5 fL    Neutrophils % 75.9 %    Lymphocytes % 19.1 %    Monocytes % 4.3 %    Eosinophils % 0.3 %    Basophils % 0.4 %    Neutrophils # 8.8 (H) 1.7 - 7.7 K/uL    Lymphocytes # 2.2 1.0 - 5.1 K/uL    Monocytes # 0.5 0.0 - 1.3 K/uL    Eosinophils # 0.0 0.0 - 0.6 K/uL    Basophils # 0.0 0.0 - 0.2 K/uL   POCT Venous   Result Value Ref Range    POC Sodium 140 136 - 145 mmol/L    POC Potassium

## 2018-12-07 NOTE — ED NOTES
Assumed care of patient from [facility]. Patient resting quietly in bed with no complaints. Patient awaiting bed for admission. Will continue to monitor.      Janina Black RN  12/07/18 8386

## 2018-12-08 VITALS
WEIGHT: 200 LBS | DIASTOLIC BLOOD PRESSURE: 83 MMHG | RESPIRATION RATE: 17 BRPM | OXYGEN SATURATION: 97 % | TEMPERATURE: 98.2 F | BODY MASS INDEX: 27.09 KG/M2 | HEART RATE: 72 BPM | HEIGHT: 72 IN | SYSTOLIC BLOOD PRESSURE: 127 MMHG

## 2018-12-08 LAB
ANION GAP SERPL CALCULATED.3IONS-SCNC: 15 MMOL/L (ref 3–16)
BUN BLDV-MCNC: 13 MG/DL (ref 7–20)
CALCIUM SERPL-MCNC: 9.7 MG/DL (ref 8.3–10.6)
CHLORIDE BLD-SCNC: 102 MMOL/L (ref 99–110)
CHOLESTEROL, TOTAL: 251 MG/DL (ref 0–199)
CO2: 23 MMOL/L (ref 21–32)
CREAT SERPL-MCNC: 1.3 MG/DL (ref 0.9–1.3)
GFR AFRICAN AMERICAN: >60
GFR NON-AFRICAN AMERICAN: 57
GLUCOSE BLD-MCNC: 111 MG/DL (ref 70–99)
HCT VFR BLD CALC: 48.6 % (ref 40.5–52.5)
HDLC SERPL-MCNC: 39 MG/DL (ref 40–60)
HEMOGLOBIN: 16.6 G/DL (ref 13.5–17.5)
LDL CHOLESTEROL CALCULATED: 187 MG/DL
MCH RBC QN AUTO: 31.7 PG (ref 26–34)
MCHC RBC AUTO-ENTMCNC: 34.1 G/DL (ref 31–36)
MCV RBC AUTO: 92.8 FL (ref 80–100)
PDW BLD-RTO: 13.6 % (ref 12.4–15.4)
PLATELET # BLD: 270 K/UL (ref 135–450)
PMV BLD AUTO: 7 FL (ref 5–10.5)
POTASSIUM REFLEX MAGNESIUM: 3.9 MMOL/L (ref 3.5–5.1)
RBC # BLD: 5.24 M/UL (ref 4.2–5.9)
REASON FOR REJECTION: NORMAL
REJECTED TEST: NORMAL
SODIUM BLD-SCNC: 140 MMOL/L (ref 136–145)
TRIGL SERPL-MCNC: 124 MG/DL (ref 0–150)
TSH REFLEX: 0.92 UIU/ML (ref 0.27–4.2)
VLDLC SERPL CALC-MCNC: 25 MG/DL
WBC # BLD: 10.2 K/UL (ref 4–11)

## 2018-12-08 PROCEDURE — 84443 ASSAY THYROID STIM HORMONE: CPT

## 2018-12-08 PROCEDURE — 6370000000 HC RX 637 (ALT 250 FOR IP): Performed by: FAMILY MEDICINE

## 2018-12-08 PROCEDURE — 2580000003 HC RX 258: Performed by: FAMILY MEDICINE

## 2018-12-08 PROCEDURE — 80048 BASIC METABOLIC PNL TOTAL CA: CPT

## 2018-12-08 PROCEDURE — 80061 LIPID PANEL: CPT

## 2018-12-08 PROCEDURE — 6360000002 HC RX W HCPCS: Performed by: STUDENT IN AN ORGANIZED HEALTH CARE EDUCATION/TRAINING PROGRAM

## 2018-12-08 PROCEDURE — 36415 COLL VENOUS BLD VENIPUNCTURE: CPT

## 2018-12-08 PROCEDURE — 6370000000 HC RX 637 (ALT 250 FOR IP): Performed by: STUDENT IN AN ORGANIZED HEALTH CARE EDUCATION/TRAINING PROGRAM

## 2018-12-08 PROCEDURE — 6370000000 HC RX 637 (ALT 250 FOR IP): Performed by: NURSE PRACTITIONER

## 2018-12-08 PROCEDURE — 6370000000 HC RX 637 (ALT 250 FOR IP): Performed by: INTERNAL MEDICINE

## 2018-12-08 PROCEDURE — 99233 SBSQ HOSP IP/OBS HIGH 50: CPT | Performed by: NURSE PRACTITIONER

## 2018-12-08 PROCEDURE — 93005 ELECTROCARDIOGRAM TRACING: CPT | Performed by: INTERNAL MEDICINE

## 2018-12-08 PROCEDURE — 85027 COMPLETE CBC AUTOMATED: CPT

## 2018-12-08 RX ORDER — NICOTINE 21 MG/24HR
1 PATCH, TRANSDERMAL 24 HOURS TRANSDERMAL DAILY
Qty: 30 PATCH | Refills: 0 | Status: SHIPPED | OUTPATIENT
Start: 2018-12-09 | End: 2021-10-27 | Stop reason: ALTCHOICE

## 2018-12-08 RX ORDER — FAMOTIDINE 20 MG/1
20 TABLET, FILM COATED ORAL 2 TIMES DAILY
Qty: 60 TABLET | Refills: 0 | Status: SHIPPED | OUTPATIENT
Start: 2018-12-08 | End: 2019-01-30 | Stop reason: SDUPTHER

## 2018-12-08 RX ORDER — LOSARTAN POTASSIUM 25 MG/1
25 TABLET ORAL DAILY
Qty: 30 TABLET | Refills: 0 | Status: SHIPPED | OUTPATIENT
Start: 2018-12-09 | End: 2019-01-04 | Stop reason: SDUPTHER

## 2018-12-08 RX ORDER — CLOPIDOGREL BISULFATE 75 MG/1
75 TABLET ORAL DAILY
Qty: 30 TABLET | Refills: 0 | Status: SHIPPED | OUTPATIENT
Start: 2018-12-09 | End: 2019-01-04 | Stop reason: SDUPTHER

## 2018-12-08 RX ORDER — POTASSIUM CHLORIDE 20 MEQ/1
20 TABLET, EXTENDED RELEASE ORAL ONCE
Status: COMPLETED | OUTPATIENT
Start: 2018-12-08 | End: 2018-12-08

## 2018-12-08 RX ORDER — LIDOCAINE 4 G/G
1 PATCH TOPICAL ONCE
Status: DISCONTINUED | OUTPATIENT
Start: 2018-12-08 | End: 2018-12-08 | Stop reason: HOSPADM

## 2018-12-08 RX ORDER — ASPIRIN 81 MG/1
81 TABLET ORAL DAILY
Qty: 30 TABLET | Refills: 0 | Status: SHIPPED | OUTPATIENT
Start: 2018-12-09 | End: 2021-03-16 | Stop reason: ALTCHOICE

## 2018-12-08 RX ORDER — ATORVASTATIN CALCIUM 40 MG/1
40 TABLET, FILM COATED ORAL NIGHTLY
Qty: 30 TABLET | Refills: 0 | Status: SHIPPED | OUTPATIENT
Start: 2018-12-08 | End: 2019-01-04 | Stop reason: SDUPTHER

## 2018-12-08 RX ORDER — METOPROLOL SUCCINATE 25 MG/1
25 TABLET, EXTENDED RELEASE ORAL DAILY
Qty: 30 TABLET | Refills: 0 | Status: SHIPPED | OUTPATIENT
Start: 2018-12-09 | End: 2019-01-04 | Stop reason: SDUPTHER

## 2018-12-08 RX ADMIN — CLOPIDOGREL BISULFATE 75 MG: 75 TABLET ORAL at 08:46

## 2018-12-08 RX ADMIN — Medication 10 ML: at 09:11

## 2018-12-08 RX ADMIN — METOPROLOL SUCCINATE 25 MG: 25 TABLET, EXTENDED RELEASE ORAL at 08:46

## 2018-12-08 RX ADMIN — FAMOTIDINE 20 MG: 20 TABLET ORAL at 08:46

## 2018-12-08 RX ADMIN — FUROSEMIDE 10 MG: 10 INJECTION, SOLUTION INTRAMUSCULAR; INTRAVENOUS at 00:10

## 2018-12-08 RX ADMIN — ASPIRIN 81 MG: 81 TABLET ORAL at 08:46

## 2018-12-08 RX ADMIN — POTASSIUM CHLORIDE 20 MEQ: 20 TABLET, EXTENDED RELEASE ORAL at 10:32

## 2018-12-08 RX ADMIN — LOSARTAN POTASSIUM 25 MG: 25 TABLET, FILM COATED ORAL at 08:46

## 2018-12-08 ASSESSMENT — PAIN SCALES - GENERAL: PAINLEVEL_OUTOF10: 0

## 2018-12-08 NOTE — DISCHARGE SUMMARY
INTERNAL MEDICINE DEPARTMENT AT [address] [place] [address]  DISCHARGE SUMMARY    Patient ID: Pascual Siddiqui                                             Discharge Date: 12/8/2018   Patient's PCP: No primary care provider on file. Discharge Physician: Vel Carias MD  Admit Date: 12/6/2018   Admitting Physician: Amelia Arora MD    PROBLEMS DURING HOSPITALIZATION:  Present on Admission:   NSTEMI (non-ST elevated myocardial infarction) ([address]      DISCHARGE DIAGNOSES:    HPI:  66 y.o. male Pascual Siddiqui with PMHx of HTN, CKD III, current smoker (38 pack yr smoking hx) who presented to the ED with complains of chest pain after dinner last night. Chest pain started 45 min prior to presentation in the ED, upper sternal, 5/10 intensity, describes as pressure like associated with nausea, diaphoresis and chills. Improved with nitroglycerin and ASA in the ED. Denies shortness of breath, lightheadedness, cough, congestion or fever. On arrival BP elevated to 170/98, with HR 75, 100% SpO2 on RA. Labs with initial troponin of 0.08 but increased to 2.24. EKG with NSR, with no acute ST changes. He follows resident clinic and is being managed for his hypertension with Amlodipine 5 mg bid. The following issues were addressed during hospitalization:    NSTEMI  -Patient was taken to the cath lab on day of admission. Ballon angio was performed on the distal circumflex.   -Continue Aspirin, plavix and see Cardiology in a week  -Started on losartan, toprol XL and high dose statin    HTN  -Managed with Toprol XL, Losartan. Follow with resident clinic in a week    CKD stage 3  -Creatinine remained baseline.      Physical Exam:  /83   Pulse 72   Temp 98.2 °F (36.8 °C) (Oral)   Resp 17   Ht 6' (1.829 m)   Wt 200 lb (90.7 kg)   SpO2 97%   BMI 27.12 kg/m²     · General appearance: alert, appears stated age and cooperative  · Skin: Skin color, texture, turgor normal.   · HEENT: PERRLA: Your Medications      You can get these medications from any pharmacy    Bring a paper prescription for each of these medications  · aspirin 81 MG EC tablet  · atorvastatin 40 MG tablet  · clopidogrel 75 MG tablet  · famotidine 20 MG tablet  · losartan 25 MG tablet  · metoprolol succinate 25 MG extended release tablet  · nicotine 14 MG/24HR       Activity: activity as tolerated  Diet: cardiac diet  Wound Care: keep wound clean and dry    Time Spent on discharge is more than 30 minutes    Signed:  Francesco Avendaño MD, PGY-2   12/8/2018     My time spent reviewing discharge plan and follow ups with resident, along with performing independent review of discharge medicines along with counseling the patient exceeds 30 minutes.     [facility]

## 2018-12-08 NOTE — PROGRESS NOTES
ICU Progress Note    Admit Date: 12/6/2018  Hospital Day: 3    ICU DAY  Code:Full Code  PCP: No primary care provider on file. Chief Complaint: Chest pain; NSTEMI         SUBJECTIVE:   Interval Hx: Patient was seen and examined this afternoon. He is s/p balloon angioplasty. Denies any pain at the site of cath. No redness swelling noted. Minimal pain. No acute overnight events. No chest pain reported at this time. BP remains stable. Access:                           -Peripheral Access Day#:1                              MEDICATIONS:   Scheduled Meds:   [START ON 12/9/2018] influenza virus vaccine  0.5 mL Intramuscular Once    lidocaine  1 patch Transdermal Once    potassium chloride  20 mEq Oral Once    sodium chloride flush  10 mL Intravenous 2 times per day    atorvastatin  40 mg Oral Nightly    nicotine  1 patch Transdermal Daily    famotidine  20 mg Oral BID    metoprolol succinate  25 mg Oral Daily    clopidogrel  75 mg Oral Daily    aspirin  81 mg Oral Daily    losartan  25 mg Oral Daily    nitroglycerin  0.5 inch Topical 4 times per day      Continuous Infusions:    PRN Meds:sodium chloride flush, magnesium hydroxide, ondansetron, nitroGLYCERIN, acetaminophen  Allergies: Allergies   Allergen Reactions    Eggs Or Egg-Derived Products Other (See Comments)     stomach cramps       PHYSICAL EXAM:       Vitals: /83   Pulse 72   Temp 98.2 °F (36.8 °C) (Oral)   Resp 17   Ht 6' (1.829 m)   Wt 200 lb (90.7 kg)   SpO2 97%   BMI 27.12 kg/m²   I/O:      Intake/Output Summary (Last 24 hours) at 12/08/18 0959  Last data filed at 12/08/18 0330   Gross per 24 hour   Intake           1676.9 ml   Output             2150 ml   Net           -473.1 ml     No intake/output data recorded. I/O last 3 completed shifts: In: 1676.9 [P.O.:240;  I.V.:1436.9]  Out: 2150 [Urine:2150]    Physical Examination:   · General appearance: alert, appears stated age and cooperative  · Skin: Skin color, texture,

## 2018-12-09 LAB
EKG ATRIAL RATE: 73 BPM
EKG DIAGNOSIS: NORMAL
EKG P AXIS: 61 DEGREES
EKG P-R INTERVAL: 156 MS
EKG Q-T INTERVAL: 386 MS
EKG QRS DURATION: 102 MS
EKG QTC CALCULATION (BAZETT): 425 MS
EKG R AXIS: 107 DEGREES
EKG T AXIS: -7 DEGREES
EKG VENTRICULAR RATE: 73 BPM

## 2018-12-09 PROCEDURE — 93010 ELECTROCARDIOGRAM REPORT: CPT | Performed by: INTERNAL MEDICINE

## 2018-12-17 ENCOUNTER — OFFICE VISIT (OUTPATIENT)
Dept: CARDIOLOGY CLINIC | Age: 58
End: 2018-12-17
Payer: COMMERCIAL

## 2018-12-17 VITALS
OXYGEN SATURATION: 98 % | SYSTOLIC BLOOD PRESSURE: 126 MMHG | DIASTOLIC BLOOD PRESSURE: 84 MMHG | BODY MASS INDEX: 28.07 KG/M2 | WEIGHT: 207 LBS | HEART RATE: 83 BPM

## 2018-12-17 DIAGNOSIS — I21.4 NSTEMI (NON-ST ELEVATED MYOCARDIAL INFARCTION) (HCC): ICD-10-CM

## 2018-12-17 DIAGNOSIS — E78.00 PURE HYPERCHOLESTEROLEMIA: ICD-10-CM

## 2018-12-17 DIAGNOSIS — I10 ESSENTIAL HYPERTENSION: ICD-10-CM

## 2018-12-17 DIAGNOSIS — I25.10 CORONARY ARTERY DISEASE INVOLVING NATIVE CORONARY ARTERY OF NATIVE HEART WITHOUT ANGINA PECTORIS: Primary | ICD-10-CM

## 2018-12-17 DIAGNOSIS — Z98.61 S/P PTCA (PERCUTANEOUS TRANSLUMINAL CORONARY ANGIOPLASTY): ICD-10-CM

## 2018-12-17 PROCEDURE — 99214 OFFICE O/P EST MOD 30 MIN: CPT | Performed by: NURSE PRACTITIONER

## 2019-01-04 RX ORDER — CLOPIDOGREL BISULFATE 75 MG/1
75 TABLET ORAL DAILY
Qty: 30 TABLET | Refills: 0 | Status: SHIPPED | OUTPATIENT
Start: 2019-01-04 | End: 2019-01-30 | Stop reason: SDUPTHER

## 2019-01-04 RX ORDER — ATORVASTATIN CALCIUM 40 MG/1
40 TABLET, FILM COATED ORAL NIGHTLY
Qty: 30 TABLET | Refills: 0 | Status: SHIPPED | OUTPATIENT
Start: 2019-01-04 | End: 2019-01-30 | Stop reason: SDUPTHER

## 2019-01-04 RX ORDER — METOPROLOL SUCCINATE 25 MG/1
25 TABLET, EXTENDED RELEASE ORAL DAILY
Qty: 30 TABLET | Refills: 0 | Status: SHIPPED | OUTPATIENT
Start: 2019-01-04 | End: 2019-01-30 | Stop reason: SDUPTHER

## 2019-01-04 RX ORDER — LOSARTAN POTASSIUM 25 MG/1
25 TABLET ORAL DAILY
Qty: 30 TABLET | Refills: 0 | Status: SHIPPED | OUTPATIENT
Start: 2019-01-04 | End: 2019-01-30 | Stop reason: SDUPTHER

## 2019-01-30 ENCOUNTER — OFFICE VISIT (OUTPATIENT)
Dept: CARDIOLOGY CLINIC | Age: 59
End: 2019-01-30
Payer: COMMERCIAL

## 2019-01-30 VITALS
BODY MASS INDEX: 29.08 KG/M2 | SYSTOLIC BLOOD PRESSURE: 134 MMHG | HEART RATE: 58 BPM | DIASTOLIC BLOOD PRESSURE: 78 MMHG | WEIGHT: 214.4 LBS

## 2019-01-30 DIAGNOSIS — I25.10 CORONARY ARTERY DISEASE INVOLVING NATIVE CORONARY ARTERY OF NATIVE HEART WITHOUT ANGINA PECTORIS: Primary | ICD-10-CM

## 2019-01-30 DIAGNOSIS — I10 ESSENTIAL HYPERTENSION: ICD-10-CM

## 2019-01-30 DIAGNOSIS — E78.00 PURE HYPERCHOLESTEROLEMIA: ICD-10-CM

## 2019-01-30 DIAGNOSIS — Z98.61 S/P PTCA (PERCUTANEOUS TRANSLUMINAL CORONARY ANGIOPLASTY): ICD-10-CM

## 2019-01-30 PROCEDURE — 99214 OFFICE O/P EST MOD 30 MIN: CPT | Performed by: INTERNAL MEDICINE

## 2019-01-30 RX ORDER — METOPROLOL SUCCINATE 25 MG/1
25 TABLET, EXTENDED RELEASE ORAL DAILY
Qty: 90 TABLET | Refills: 3 | Status: SHIPPED | OUTPATIENT
Start: 2019-01-30 | End: 2019-02-07

## 2019-01-30 RX ORDER — CLOPIDOGREL BISULFATE 75 MG/1
75 TABLET ORAL DAILY
Qty: 90 TABLET | Refills: 3 | Status: SHIPPED | OUTPATIENT
Start: 2019-01-30 | End: 2019-02-07

## 2019-01-30 RX ORDER — ATORVASTATIN CALCIUM 40 MG/1
40 TABLET, FILM COATED ORAL NIGHTLY
Qty: 90 TABLET | Refills: 3 | Status: SHIPPED | OUTPATIENT
Start: 2019-01-30 | End: 2019-08-19 | Stop reason: SDUPTHER

## 2019-01-30 RX ORDER — LOSARTAN POTASSIUM 50 MG/1
50 TABLET ORAL DAILY
Qty: 90 TABLET | Refills: 3 | Status: SHIPPED | OUTPATIENT
Start: 2019-01-30 | End: 2020-02-04

## 2019-01-30 RX ORDER — FAMOTIDINE 20 MG/1
20 TABLET, FILM COATED ORAL 2 TIMES DAILY
Qty: 180 TABLET | Refills: 3 | Status: SHIPPED | OUTPATIENT
Start: 2019-01-30 | End: 2021-10-27 | Stop reason: ALTCHOICE

## 2019-01-30 ASSESSMENT — ENCOUNTER SYMPTOMS
STRIDOR: 0
SHORTNESS OF BREATH: 0
ALLERGIC/IMMUNOLOGIC NEGATIVE: 1
CHEST TIGHTNESS: 0
CHOKING: 0
GASTROINTESTINAL NEGATIVE: 1
COUGH: 0
APNEA: 0
WHEEZING: 0
EYES NEGATIVE: 1

## 2019-02-07 RX ORDER — METOPROLOL SUCCINATE 25 MG/1
25 TABLET, EXTENDED RELEASE ORAL DAILY
Qty: 90 TABLET | Refills: 3 | Status: SHIPPED | OUTPATIENT
Start: 2019-02-07 | End: 2020-03-13 | Stop reason: SDUPTHER

## 2019-02-07 RX ORDER — CLOPIDOGREL BISULFATE 75 MG/1
75 TABLET ORAL DAILY
Qty: 90 TABLET | Refills: 3 | Status: SHIPPED | OUTPATIENT
Start: 2019-02-07 | End: 2020-03-13 | Stop reason: SDUPTHER

## 2019-08-19 ENCOUNTER — OFFICE VISIT (OUTPATIENT)
Dept: CARDIOLOGY CLINIC | Age: 59
End: 2019-08-19
Payer: COMMERCIAL

## 2019-08-19 VITALS
DIASTOLIC BLOOD PRESSURE: 80 MMHG | WEIGHT: 202.8 LBS | BODY MASS INDEX: 27.5 KG/M2 | SYSTOLIC BLOOD PRESSURE: 130 MMHG | HEART RATE: 80 BPM

## 2019-08-19 DIAGNOSIS — E78.2 MIXED HYPERLIPIDEMIA: ICD-10-CM

## 2019-08-19 DIAGNOSIS — I10 ESSENTIAL HYPERTENSION: Primary | ICD-10-CM

## 2019-08-19 DIAGNOSIS — I21.4 NSTEMI (NON-ST ELEVATED MYOCARDIAL INFARCTION) (HCC): ICD-10-CM

## 2019-08-19 PROCEDURE — 99213 OFFICE O/P EST LOW 20 MIN: CPT | Performed by: INTERNAL MEDICINE

## 2019-08-19 RX ORDER — ATORVASTATIN CALCIUM 40 MG/1
40 TABLET, FILM COATED ORAL NIGHTLY
Qty: 90 TABLET | Refills: 3 | Status: SHIPPED | OUTPATIENT
Start: 2019-08-19 | End: 2020-02-04

## 2019-08-19 ASSESSMENT — ENCOUNTER SYMPTOMS
CHOKING: 0
STRIDOR: 0
APNEA: 0
EYES NEGATIVE: 1
GASTROINTESTINAL NEGATIVE: 1
SHORTNESS OF BREATH: 0
COUGH: 0
ALLERGIC/IMMUNOLOGIC NEGATIVE: 1
WHEEZING: 0
CHEST TIGHTNESS: 0

## 2020-02-05 RX ORDER — LOSARTAN POTASSIUM 50 MG/1
TABLET ORAL
Qty: 90 TABLET | Refills: 3 | Status: SHIPPED | OUTPATIENT
Start: 2020-02-05 | End: 2020-09-08 | Stop reason: ALTCHOICE

## 2020-02-05 RX ORDER — ATORVASTATIN CALCIUM 40 MG/1
TABLET, FILM COATED ORAL
Qty: 90 TABLET | Refills: 3 | Status: SHIPPED | OUTPATIENT
Start: 2020-02-05 | End: 2020-12-10 | Stop reason: ALTCHOICE

## 2020-02-14 ENCOUNTER — OFFICE VISIT (OUTPATIENT)
Dept: INTERNAL MEDICINE CLINIC | Age: 60
End: 2020-02-14
Payer: COMMERCIAL

## 2020-02-14 VITALS
BODY MASS INDEX: 28.69 KG/M2 | OXYGEN SATURATION: 98 % | DIASTOLIC BLOOD PRESSURE: 80 MMHG | HEIGHT: 72 IN | TEMPERATURE: 97.6 F | SYSTOLIC BLOOD PRESSURE: 126 MMHG | WEIGHT: 211.8 LBS | RESPIRATION RATE: 18 BRPM | HEART RATE: 71 BPM

## 2020-02-14 LAB
BILIRUBIN URINE: NEGATIVE MG/DL
BLOOD, URINE: ABNORMAL
CLARITY: ABNORMAL
COLOR: ABNORMAL
GLUCOSE URINE: NEGATIVE MG/DL
KETONES, URINE: NEGATIVE MG/DL
LEUKOCYTE ESTERASE, URINE: ABNORMAL
MICROSCOPIC EXAMINATION: YES
NITRITE, URINE: NEGATIVE
PH UA: 5.5 (ref 5–8)
PROTEIN UA: 100 MG/DL
SPECIFIC GRAVITY UA: 1.01 (ref 1–1.03)
UROBILINOGEN, URINE: 0.2 E.U./DL

## 2020-02-14 PROCEDURE — 81003 URINALYSIS AUTO W/O SCOPE: CPT

## 2020-02-14 PROCEDURE — 87086 URINE CULTURE/COLONY COUNT: CPT

## 2020-02-14 PROCEDURE — 87077 CULTURE AEROBIC IDENTIFY: CPT

## 2020-02-14 PROCEDURE — 87186 SC STD MICRODIL/AGAR DIL: CPT

## 2020-02-14 PROCEDURE — 99213 OFFICE O/P EST LOW 20 MIN: CPT

## 2020-02-14 RX ORDER — ATORVASTATIN CALCIUM 40 MG/1
40 TABLET, FILM COATED ORAL DAILY
COMMUNITY
End: 2020-06-16 | Stop reason: SINTOL

## 2020-02-14 RX ORDER — CLOPIDOGREL BISULFATE 75 MG/1
75 TABLET ORAL DAILY
COMMUNITY

## 2020-02-14 RX ORDER — CIPROFLOXACIN 500 MG/1
500 TABLET, FILM COATED ORAL 2 TIMES DAILY
Qty: 10 TABLET | Refills: 0 | Status: SHIPPED | OUTPATIENT
Start: 2020-02-14 | End: 2020-02-19

## 2020-02-14 RX ORDER — METOPROLOL SUCCINATE 25 MG/1
25 TABLET, EXTENDED RELEASE ORAL DAILY
COMMUNITY

## 2020-02-14 RX ORDER — LOSARTAN POTASSIUM 50 MG/1
50 TABLET ORAL DAILY
COMMUNITY
End: 2020-06-16 | Stop reason: SINTOL

## 2020-02-14 ASSESSMENT — PATIENT HEALTH QUESTIONNAIRE - PHQ9
SUM OF ALL RESPONSES TO PHQ QUESTIONS 1-9: 0
SUM OF ALL RESPONSES TO PHQ QUESTIONS 1-9: 0
2. FEELING DOWN, DEPRESSED OR HOPELESS: 0
1. LITTLE INTEREST OR PLEASURE IN DOING THINGS: 0
SUM OF ALL RESPONSES TO PHQ9 QUESTIONS 1 & 2: 0

## 2020-02-14 NOTE — PATIENT INSTRUCTIONS
Get fast  Labs at medical [facility] lab. Continue current medications. Ciprofloxin one tablet twice a day.    Return

## 2020-02-14 NOTE — PROGRESS NOTES
2020    Clemmie Saint (:  1960) is a 64 y.o. male, here for evaluation of the following medical concerns: establish care    HPI  Treatment Adherence:   Medication compliance:  compliant all of the time  Diet compliance:  compliant most of the time  Weight trend: stable  Current exercise: no regular exercise  Barriers: none    CAD s/p balloon angioplasty to LCx - Done by Dr. Jamie Gusman. Will have an appointment with him in the next month. Denies chest pain, SOB, abd pain, N/V. Can walk up a flight of stairs without pain or discomfort. Does endorse headaches he believes is from one of his medications. Hyperlipidemia:  No new myalgias or GI upset on atorvastatin (Lipitor). No results found for: NA No results found for: BUN No results found for: GLUCOSE   No results found for: K No results found for: CREATININE      No results found for: CHOL, TRIG, HDL, LDLCALC, LDLDIRECT  No results found for: ALT, AST       Review of Systems - Per HPI    Prior to Visit Medications    Medication Sig Taking? Authorizing Provider   losartan (COZAAR) 50 MG tablet Take 50 mg by mouth daily Yes Historical Provider, MD   atorvastatin (LIPITOR) 40 MG tablet Take 40 mg by mouth daily Yes Historical Provider, MD   metoprolol succinate (TOPROL XL) 25 MG extended release tablet Take 25 mg by mouth daily Yes Historical Provider, MD   clopidogrel (PLAVIX) 75 MG tablet Take 75 mg by mouth daily Yes Historical Provider, MD        Allergies   Allergen Reactions    Eggs Or Egg-Derived Products        No past medical history on file. No past surgical history on file.     Social History     Socioeconomic History    Marital status: Single     Spouse name: Not on file    Number of children: Not on file    Years of education: Not on file    Highest education level: Not on file   Occupational History    Not on file   Social Needs    Financial resource strain: Not on file    Food insecurity: Worry: Not on file     Inability: Not on file    Transportation needs:     Medical: Not on file     Non-medical: Not on file   Tobacco Use    Smoking status: Former Smoker     Packs/day: 1.00     Years: 10.00     Pack years: 10.00     Types: Cigarettes     Start date: 2010     Last attempt to quit: 2019     Years since quittin.0    Smokeless tobacco: Never Used   Substance and Sexual Activity    Alcohol use: Not on file    Drug use: Not on file    Sexual activity: Not on file   Lifestyle    Physical activity:     Days per week: Not on file     Minutes per session: Not on file    Stress: Not on file   Relationships    Social connections:     Talks on phone: Not on file     Gets together: Not on file     Attends [employer] service: Not on file     Active member of club or organization: Not on file     Attends meetings of clubs or organizations: Not on file     Relationship status: Not on file    Intimate partner violence:     Fear of current or ex partner: Not on file     Emotionally abused: Not on file     Physically abused: Not on file     Forced sexual activity: Not on file   Other Topics Concern    Not on file   Social History Narrative    Not on file        No family history on file. Vitals:    20 0912   BP: 126/80   Pulse: 71   Resp: 18   Temp: 97.6 °F (36.4 °C)   TempSrc: Oral   SpO2: 98%   Weight: 211 lb 12.8 oz (96.1 kg)   Height: 6' (1.829 m)     Estimated body mass index is 28.73 kg/m² as calculated from the following:    Height as of this encounter: 6' (1.829 m). Weight as of this encounter: 211 lb 12.8 oz (96.1 kg). Physical Exam  Vitals signs reviewed. Constitutional:       General: He is not in acute distress. Appearance: Normal appearance. He is normal weight. HENT:      Head: Normocephalic and atraumatic. Mouth/Throat:      Mouth: Mucous membranes are moist.      Pharynx: Oropharynx is clear. No oropharyngeal exudate.    Eyes:      Extraocular

## 2020-02-16 LAB
ORGANISM: ABNORMAL
URINE CULTURE, ROUTINE: ABNORMAL

## 2020-02-18 DIAGNOSIS — I25.10 CORONARY ARTERY DISEASE INVOLVING NATIVE CORONARY ARTERY OF NATIVE HEART WITHOUT ANGINA PECTORIS: ICD-10-CM

## 2020-02-18 DIAGNOSIS — E78.2 MIXED HYPERLIPIDEMIA: ICD-10-CM

## 2020-02-18 DIAGNOSIS — N30.00 ACUTE CYSTITIS WITHOUT HEMATURIA: ICD-10-CM

## 2020-02-18 LAB
A/G RATIO: 1.4 (ref 1.1–2.2)
ALBUMIN SERPL-MCNC: 3.9 G/DL (ref 3.4–5)
ALP BLD-CCNC: 77 U/L (ref 40–129)
ALT SERPL-CCNC: 37 U/L (ref 10–40)
ANION GAP SERPL CALCULATED.3IONS-SCNC: 14 MMOL/L (ref 3–16)
AST SERPL-CCNC: 30 U/L (ref 15–37)
BILIRUB SERPL-MCNC: 0.3 MG/DL (ref 0–1)
BUN BLDV-MCNC: 16 MG/DL (ref 7–20)
CALCIUM SERPL-MCNC: 9.1 MG/DL (ref 8.3–10.6)
CHLORIDE BLD-SCNC: 108 MMOL/L (ref 99–110)
CHOLESTEROL, FASTING: 151 MG/DL (ref 0–199)
CO2: 23 MMOL/L (ref 21–32)
CREAT SERPL-MCNC: 1.8 MG/DL (ref 0.9–1.3)
GFR AFRICAN AMERICAN: 47
GFR NON-AFRICAN AMERICAN: 39
GLOBULIN: 2.7 G/DL
GLUCOSE BLD-MCNC: 104 MG/DL (ref 70–99)
HDLC SERPL-MCNC: 31 MG/DL (ref 40–60)
LDL CHOLESTEROL CALCULATED: 96 MG/DL
POTASSIUM SERPL-SCNC: 4.7 MMOL/L (ref 3.5–5.1)
PROSTATE SPECIFIC ANTIGEN: 2.73 NG/ML (ref 0–4)
SODIUM BLD-SCNC: 145 MMOL/L (ref 136–145)
TOTAL PROTEIN: 6.6 G/DL (ref 6.4–8.2)
TRIGLYCERIDE, FASTING: 120 MG/DL (ref 0–150)
VLDLC SERPL CALC-MCNC: 24 MG/DL

## 2020-02-24 ENCOUNTER — OFFICE VISIT (OUTPATIENT)
Dept: CARDIOLOGY CLINIC | Age: 60
End: 2020-02-24
Payer: COMMERCIAL

## 2020-02-24 VITALS
SYSTOLIC BLOOD PRESSURE: 138 MMHG | DIASTOLIC BLOOD PRESSURE: 82 MMHG | HEART RATE: 84 BPM | BODY MASS INDEX: 29.24 KG/M2 | WEIGHT: 215.6 LBS

## 2020-02-24 PROCEDURE — 99213 OFFICE O/P EST LOW 20 MIN: CPT | Performed by: INTERNAL MEDICINE

## 2020-02-24 ASSESSMENT — ENCOUNTER SYMPTOMS
APNEA: 0
SHORTNESS OF BREATH: 0
CHOKING: 0
GASTROINTESTINAL NEGATIVE: 1
WHEEZING: 0
ALLERGIC/IMMUNOLOGIC NEGATIVE: 1
EYES NEGATIVE: 1
COUGH: 0
CHEST TIGHTNESS: 0
STRIDOR: 0

## 2020-02-24 NOTE — PROGRESS NOTES
Subjective:      Patient ID: Talya Cotton is a 64 y.o. male    CC: s/p PCI     HPI:   Talya Cotton is a 66year old male with HX of HLP, HTN and CAD. He presents today for follow up from  (POBA distal LCx). 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 No Sob no orthopnea. Allergies   Allergen Reactions    Eggs Or Egg-Derived Products Other (See Comments)     stomach cramps       Social History     Socioeconomic History    Marital status: Legally      Spouse name: None    Number of children: None    Years of education: None    Highest education level: None   Occupational History    None   Social Needs    Financial resource strain: None    Food insecurity:     Worry: None     Inability: None    Transportation needs:     Medical: None     Non-medical: None   Tobacco Use    Smoking status: Current Every Day Smoker     Packs/day: 1.00    Smokeless tobacco: Never Used    Tobacco comment: started at age 27   Substance and Sexual Activity    Alcohol use: Yes     Comment: weekends     Drug use: Yes     Types: Marijuana     Comment: occ    Sexual activity: None   Lifestyle    Physical activity:     Days per week: None     Minutes per session: None    Stress: None   Relationships    Social connections:     Talks on phone: None     Gets together: None     Attends Buddhist service: None     Active member of club or organization: None     Attends meetings of clubs or organizations: None     Relationship status: None    Intimate partner violence:     Fear of current or ex partner: None     Emotionally abused: None     Physically abused: None     Forced sexual activity: None   Other Topics Concern    None   Social History Narrative    None       No family history on file. has a past medical history of CAD (coronary artery disease). Review of Systems   Constitutional: Negative. HENT: Negative. Eyes: Negative.     Respiratory: Negative for apnea, focal obstruction. True circumflex proximally is  normal.  Obtuse marginal is normal.  The distal circumflex bifurcates  into two very small vessels and they appeared to be subtotally occluded.     JR-4 catheter engages the right coronary artery. That was a normal  dominant right coronary artery. Right posterior descending is normal.   Right posterolateral branch is normal.  There is no collateral supply or  right-to-left or left-to-right collaterals seen.     It was elected to proceed with PCI upon the distal left circumflex. Upfront, I realized that this was going to be a difficult angioplasty  because the vessels involved were very small.     XB 3.0 guide was used. A 190 BMW guidewire was reshaped and reshaped  and eventually it was placed into the distal circumflex. I took a 2.0  balloon, inflated it to 8 atmospheres x30 seconds. I did establish flow  in the more medial branch. I had to use a second wire. A second BMW  guidewire was advanced. I _____ often balloon the other branch artery  and then I put a second BMW guidewire down. I used a 1.5 mm balloon. A  1.5 mm balloon was placed in the more lateral branch and inflations  occurred to 8 atmospheres with repositioning x3. There was TAWANA-3 blood  flow in the lateral branch. There was TAWANA 1 to 2 flow in the more  medial branch. I elected to conclude the procedure at that juncture. The ACT was 200 seconds. At the end, he had received heparin per  protocol. I started an Aggrastat drip at the end. The reason why I did  not use it upfront was I felt like I was working on such small vessels  that I was concerned about perforation, and so I did not want to _____  upfront.     Interestingly, the patient was pain free at the conclusion of procedure. There was no rash in the body, he had no problems controlling his  saliva; however, he was developing singultus.   I gave him Zofran 4 mg IV  push and I did treat him with Benadryl 25 mg in case he has dye allergy. Pigtail catheter in the left ventricle post angioplasty showed an EDP of  about 14 mmHg. The LV pressure was 167/5 and the AO pressure was  163/89. There was no significant gradient.     Please note that the start time of the procedure was 09:30 and the stop  time of the procedure was 10:53 a.m. He was given 4 mg of Versed and  200 mcg of fentanyl by my order in divided doses. He received heparin  during the procedure and an Aggrastat bolus. I did use 300 mL of  contrast during the procedure.     PLAN:  Hydration. Bedrest.  Angio-Seal was successfully deployed in the  right femoral arteriotomy. 600 of Plavix was given, 25 mg of Benadryl  was given and 4 mg Zofran was given. Risk factor modification. Beta  blockers and ARB administration.           Padmini Parsons MD     D: 12/07/2018 11:35:09       T: 12/07/2018 12:40:29     [id number]  Job#: [id number]     Doc#: [id number]     CC:     Assessment:       Diagnosis Orders   1. Other forms of angina pectoris ([address]     2. Essential hypertension               Plan:      CAD- stable S/p PCTA-  HLP: ldl was high now on lipitor. Check labs  HTN- controlled with meds increase cozaar to 50 mg daily and now has HA so take 1/2 tab.

## 2020-03-11 RX ORDER — CLOPIDOGREL BISULFATE 75 MG/1
75 TABLET ORAL DAILY
Qty: 90 TABLET | Refills: 3 | Status: CANCELLED | OUTPATIENT
Start: 2020-03-11

## 2020-03-11 RX ORDER — METOPROLOL SUCCINATE 25 MG/1
25 TABLET, EXTENDED RELEASE ORAL DAILY
Qty: 90 TABLET | Refills: 3 | Status: CANCELLED | OUTPATIENT
Start: 2020-03-11

## 2020-03-12 NOTE — TELEPHONE ENCOUNTER
Patient need this sent to his [facility] DR LALI THOMPSON. It was sent to the hosp pharmacy instead.

## 2020-03-13 ENCOUNTER — TELEPHONE (OUTPATIENT)
Dept: CARDIOLOGY CLINIC | Age: 60
End: 2020-03-13

## 2020-03-13 NOTE — TELEPHONE ENCOUNTER
Pharmacy requesting verbal order to refill patient prescriptions because they are over a year old and still have refills.  Please [address] on [address] at [phone]

## 2020-03-16 RX ORDER — METOPROLOL SUCCINATE 25 MG/1
25 TABLET, EXTENDED RELEASE ORAL DAILY
Qty: 90 TABLET | Refills: 3 | Status: SHIPPED | OUTPATIENT
Start: 2020-03-16 | End: 2020-04-15 | Stop reason: SDUPTHER

## 2020-03-16 RX ORDER — CLOPIDOGREL BISULFATE 75 MG/1
75 TABLET ORAL DAILY
Qty: 90 TABLET | Refills: 3 | Status: SHIPPED | OUTPATIENT
Start: 2020-03-16 | End: 2020-04-15 | Stop reason: SDUPTHER

## 2020-03-16 RX ORDER — CLOPIDOGREL BISULFATE 75 MG/1
75 TABLET ORAL DAILY
Qty: 90 TABLET | Refills: 3 | Status: SHIPPED | OUTPATIENT
Start: 2020-03-16 | End: 2020-03-16 | Stop reason: SDUPTHER

## 2020-04-15 RX ORDER — CLOPIDOGREL BISULFATE 75 MG/1
75 TABLET ORAL DAILY
Qty: 90 TABLET | Refills: 3 | Status: SHIPPED | OUTPATIENT
Start: 2020-04-15 | End: 2021-03-16 | Stop reason: ALTCHOICE

## 2020-04-15 RX ORDER — METOPROLOL SUCCINATE 25 MG/1
25 TABLET, EXTENDED RELEASE ORAL DAILY
Qty: 90 TABLET | Refills: 3 | Status: SHIPPED | OUTPATIENT
Start: 2020-04-15 | End: 2021-03-16 | Stop reason: SDUPTHER

## 2020-04-15 RX ORDER — METOPROLOL SUCCINATE 25 MG/1
TABLET, EXTENDED RELEASE ORAL
Qty: 30 TABLET | Refills: 5 | OUTPATIENT
Start: 2020-04-15

## 2020-04-15 RX ORDER — CLOPIDOGREL BISULFATE 75 MG/1
TABLET ORAL
Qty: 30 TABLET | Refills: 5 | OUTPATIENT
Start: 2020-04-15

## 2020-06-12 ENCOUNTER — OFFICE VISIT (OUTPATIENT)
Dept: INTERNAL MEDICINE CLINIC | Age: 60
End: 2020-06-12
Payer: COMMERCIAL

## 2020-06-12 VITALS
TEMPERATURE: 97.5 F | HEART RATE: 73 BPM | RESPIRATION RATE: 16 BRPM | HEIGHT: 72 IN | BODY MASS INDEX: 28.51 KG/M2 | DIASTOLIC BLOOD PRESSURE: 94 MMHG | OXYGEN SATURATION: 95 % | WEIGHT: 210.5 LBS | SYSTOLIC BLOOD PRESSURE: 156 MMHG

## 2020-06-12 PROBLEM — I10 ESSENTIAL HYPERTENSION: Status: ACTIVE | Noted: 2020-06-12

## 2020-06-12 PROBLEM — I25.10 CORONARY ARTERY DISEASE INVOLVING NATIVE CORONARY ARTERY OF NATIVE HEART WITHOUT ANGINA PECTORIS: Status: ACTIVE | Noted: 2020-06-12

## 2020-06-12 PROBLEM — N18.30 CKD (CHRONIC KIDNEY DISEASE) STAGE 3, GFR 30-59 ML/MIN (HCC): Status: ACTIVE | Noted: 2020-06-12

## 2020-06-12 LAB
BILIRUBIN URINE: NEGATIVE
BLOOD, URINE: NEGATIVE
CLARITY: CLEAR
COLOR: YELLOW
EPITHELIAL CELLS, UA: NORMAL /HPF (ref 0–5)
GLUCOSE URINE: NEGATIVE MG/DL
KETONES, URINE: NEGATIVE MG/DL
LEUKOCYTE ESTERASE, URINE: ABNORMAL
MICROSCOPIC EXAMINATION: YES
NITRITE, URINE: NEGATIVE
PH UA: 6 (ref 5–8)
PROTEIN UA: 100 MG/DL
RBC UA: NORMAL /HPF (ref 0–4)
SPECIFIC GRAVITY UA: 1.01 (ref 1–1.03)
URINE TYPE: ABNORMAL
UROBILINOGEN, URINE: 0.2 E.U./DL
WBC UA: NORMAL /HPF (ref 0–5)

## 2020-06-12 PROCEDURE — 99213 OFFICE O/P EST LOW 20 MIN: CPT | Performed by: STUDENT IN AN ORGANIZED HEALTH CARE EDUCATION/TRAINING PROGRAM

## 2020-06-12 PROCEDURE — 83036 HEMOGLOBIN GLYCOSYLATED A1C: CPT | Performed by: STUDENT IN AN ORGANIZED HEALTH CARE EDUCATION/TRAINING PROGRAM

## 2020-06-12 PROCEDURE — 87086 URINE CULTURE/COLONY COUNT: CPT

## 2020-06-12 PROCEDURE — 87077 CULTURE AEROBIC IDENTIFY: CPT

## 2020-06-12 PROCEDURE — 87186 SC STD MICRODIL/AGAR DIL: CPT

## 2020-06-12 PROCEDURE — 81001 URINALYSIS AUTO W/SCOPE: CPT

## 2020-06-12 RX ORDER — ROSUVASTATIN CALCIUM 10 MG/1
20 TABLET, COATED ORAL DAILY
Qty: 30 TABLET | Refills: 0 | Status: SHIPPED | OUTPATIENT
Start: 2020-06-12 | End: 2020-06-16

## 2020-06-12 RX ORDER — LOSARTAN POTASSIUM AND HYDROCHLOROTHIAZIDE 12.5; 1 MG/1; MG/1
1 TABLET ORAL DAILY
Qty: 30 TABLET | Refills: 0 | Status: SHIPPED | OUTPATIENT
Start: 2020-06-12 | End: 2020-06-16

## 2020-06-12 RX ORDER — CIPROFLOXACIN 500 MG/1
500 TABLET, FILM COATED ORAL 2 TIMES DAILY
Qty: 6 TABLET | Refills: 0 | Status: SHIPPED | OUTPATIENT
Start: 2020-06-12 | End: 2020-06-16

## 2020-06-12 ASSESSMENT — ENCOUNTER SYMPTOMS
DIARRHEA: 0
ABDOMINAL DISTENTION: 0
WHEEZING: 0
SHORTNESS OF BREATH: 0
VOMITING: 0
NAUSEA: 0
ABDOMINAL PAIN: 0
COUGH: 0

## 2020-06-12 NOTE — PROGRESS NOTES
2020     Lori Jiang (:  1960) is a 64 y.o. male, here for evaluation of the following medical concerns:    HPI  64year old M with PMH of HTN, CAD, and CKD3 who is here for follow-up clinic visit. Patient saw cardiologist in  and notes states decreased cozaar to 25mg daily due to headaches. Patient states he was having muscle spasms and his lipitor was decreased by half to 20mg and his muscle spasms have decreased though not resolved. At last appointment patient had acute cystitis which has resolved with medications though beginning the last couple of days in the morning he has been having mild dysuria which is how he presented prior. Review of Systems   Constitutional: Negative for chills, fatigue and fever. HENT: Negative for congestion. Respiratory: Negative for cough, shortness of breath and wheezing. Cardiovascular: Negative for chest pain and palpitations. Gastrointestinal: Negative for abdominal distention, abdominal pain, diarrhea, nausea and vomiting. Genitourinary: Positive for dysuria. Musculoskeletal: Positive for myalgias (decreased since cutting lipitor in half). Neurological: Negative for dizziness, syncope, weakness, light-headedness and numbness. Prior to Visit Medications    Medication Sig Taking?  Authorizing Provider   Famotidine (PEPCID PO) Take by mouth Yes Historical Provider, MD   rosuvastatin (CRESTOR) 10 MG tablet Take 2 tablets by mouth daily Yes Linda Pennington MD   losartan-hydrochlorothiazide (HYZAAR) 100-12.5 MG per tablet Take 1 tablet by mouth daily Yes Linda Pennington MD   ciprofloxacin (CIPRO) 500 MG tablet Take 1 tablet by mouth 2 times daily for 3 days Yes Linda Pennington MD   losartan (COZAAR) 50 MG tablet Take 50 mg by mouth daily Yes Historical Provider, MD   atorvastatin (LIPITOR) 40 MG tablet Take 40 mg by mouth daily Yes Historical Provider, MD   metoprolol succinate (TOPROL XL) 25 MG extended release tablet Take 25 mg by mouth daily Yes Historical Provider, MD   clopidogrel (PLAVIX) 75 MG tablet Take 75 mg by mouth daily Yes Historical Provider, MD        Social History     Tobacco Use    Smoking status: Former Smoker     Packs/day: 1.00     Years: 10.00     Pack years: 10.00     Types: Cigarettes     Start date: 2010     Last attempt to quit: 2019     Years since quittin.3    Smokeless tobacco: Never Used   Substance Use Topics    Alcohol use: Not on file        Vitals:    20 0819 20 0822   BP: (!) 149/97 (!) 156/94   Site: Left Upper Arm Right Upper Arm   Position: Sitting Sitting   Cuff Size: Large Adult Large Adult   Pulse: 73    Resp: 16    Temp: 97.5 °F (36.4 °C)    TempSrc: Oral    SpO2: 95%    Weight: 210 lb 8 oz (95.5 kg)    Height: 6' (1.829 m)      Estimated body mass index is 28.55 kg/m² as calculated from the following:    Height as of this encounter: 6' (1.829 m). Weight as of this encounter: 210 lb 8 oz (95.5 kg). Physical Exam  Constitutional:       General: He is not in acute distress. Appearance: He is well-developed. He is not diaphoretic. HENT:      Head: Normocephalic and atraumatic. Cardiovascular:      Rate and Rhythm: Normal rate and regular rhythm. Heart sounds: Normal heart sounds. No murmur. Pulmonary:      Effort: Pulmonary effort is normal. No respiratory distress. Breath sounds: Normal breath sounds. No wheezing. Abdominal:      General: Bowel sounds are normal. There is no distension. Palpations: Abdomen is soft. Tenderness: There is no abdominal tenderness. Skin:     General: Skin is warm and dry. Capillary Refill: Capillary refill takes less than 2 seconds. Findings: No erythema. Neurological:      Mental Status: He is alert and oriented to person, place, and time. Psychiatric:         Behavior: Behavior normal.         ASSESSMENT/PLAN:  1.  Essential hypertension  - Switch cozaar to hyzaar due to continued uncontrolled hypertension.  - F/u in 1 month for BP check  - losartan-hydrochlorothiazide (HYZAAR) 100-12.5 MG per tablet; Take 1 tablet by mouth daily  Dispense: 30 tablet; Refill: 0    2. CKD (chronic kidney disease) stage 3, GFR 30-59 ml/min ([facility])  - Recheck labs, last Cr 1.8 when had UTI  - RENAL FUNCTION PANEL; Future    3. Coronary artery disease involving native coronary artery of native heart without angina pectoris  - Continue with Plavix  - Follows with Dr. Td Garcia    4. Diabetes mellitus screening  - Hb A1c 6.1 in the office  - Educated on diet and exercise  - Hemoglobin A1C; Standing  - Hemoglobin A1C    5. Hyperlipidemia, unspecified hyperlipidemia type  - Switching lipitor to crestor due to myalgias  - rosuvastatin (CRESTOR) 10 MG tablet; Take 2 tablets by mouth daily  Dispense: 30 tablet; Refill: 0  - LIPID PANEL; Future    6. Acute cystitis without hematuria  - Symptoms improved with medication back in February  - Symptoms restarted in last few days  - Will get UA with culture and treat with cipro 500mg bid for 3 days  - Urinalysis; Standing  - Urinalysis  - ciprofloxacin (CIPRO) 500 MG tablet; Take 1 tablet by mouth 2 times daily for 3 days  Dispense: 6 tablet; Refill: 0      Return in about 1 month (around 7/12/2020) for Office follow-up. An electronic signature was used to authenticate this note.     --Linda Pennington MD on 6/12/2020 at 9:20 AM

## 2020-06-12 NOTE — PATIENT INSTRUCTIONS
questions about a medical condition or this instruction, always ask your healthcare professional. [address] any warranty or liability for your use of this information. STOP taking lipitor. START taking Crestor. Monitor for muscle spasms. STOP taking Cozaar  START taking Hyzaar 50-12.5 (losartan-HCTZ combination)    Please get lab work prior to next visit  Please take Cipro 500mg twice a day for 3 days. Follow up in 1 month in the clinic for BP check and monitoring of symptoms with new medications  Stay hydrated while working outside.  Your new medications have a mild diuretic effect (makes you urinate) and can cause you to be more dehydrated than usual

## 2020-06-14 LAB
ORGANISM: ABNORMAL
URINE CULTURE, ROUTINE: ABNORMAL

## 2020-06-16 RX ORDER — LOSARTAN POTASSIUM AND HYDROCHLOROTHIAZIDE 12.5; 1 MG/1; MG/1
TABLET ORAL
Qty: 30 TABLET | Refills: 0 | Status: SHIPPED | OUTPATIENT
Start: 2020-06-16 | End: 2020-08-20 | Stop reason: SDUPTHER

## 2020-06-16 RX ORDER — CIPROFLOXACIN 500 MG/1
TABLET, FILM COATED ORAL
Qty: 6 TABLET | Refills: 0 | Status: SHIPPED | OUTPATIENT
Start: 2020-06-16 | End: 2021-12-02

## 2020-06-16 RX ORDER — ROSUVASTATIN CALCIUM 10 MG/1
TABLET, COATED ORAL
Qty: 30 TABLET | Refills: 0 | Status: SHIPPED | OUTPATIENT
Start: 2020-06-16 | End: 2020-07-14

## 2020-07-10 DIAGNOSIS — N18.30 CKD (CHRONIC KIDNEY DISEASE) STAGE 3, GFR 30-59 ML/MIN (HCC): ICD-10-CM

## 2020-07-10 DIAGNOSIS — E78.5 HYPERLIPIDEMIA, UNSPECIFIED HYPERLIPIDEMIA TYPE: ICD-10-CM

## 2020-07-10 LAB
ALBUMIN SERPL-MCNC: 4.5 G/DL (ref 3.4–5)
ANION GAP SERPL CALCULATED.3IONS-SCNC: 15 MMOL/L (ref 3–16)
BUN BLDV-MCNC: 28 MG/DL (ref 7–20)
CALCIUM SERPL-MCNC: 9.3 MG/DL (ref 8.3–10.6)
CHLORIDE BLD-SCNC: 101 MMOL/L (ref 99–110)
CHOLESTEROL, TOTAL: 145 MG/DL (ref 0–199)
CO2: 23 MMOL/L (ref 21–32)
CREAT SERPL-MCNC: 1.8 MG/DL (ref 0.9–1.3)
GFR AFRICAN AMERICAN: 47
GFR NON-AFRICAN AMERICAN: 39
GLUCOSE BLD-MCNC: 119 MG/DL (ref 70–99)
HDLC SERPL-MCNC: 27 MG/DL (ref 40–60)
LDL CHOLESTEROL CALCULATED: 83 MG/DL
PHOSPHORUS: 3.3 MG/DL (ref 2.5–4.9)
POTASSIUM SERPL-SCNC: 4.1 MMOL/L (ref 3.5–5.1)
SODIUM BLD-SCNC: 139 MMOL/L (ref 136–145)
TRIGL SERPL-MCNC: 175 MG/DL (ref 0–150)
VLDLC SERPL CALC-MCNC: 35 MG/DL

## 2020-07-14 RX ORDER — CIPROFLOXACIN 500 MG/1
TABLET, FILM COATED ORAL
Qty: 6 TABLET | Refills: 0 | OUTPATIENT
Start: 2020-07-14

## 2020-07-14 RX ORDER — ROSUVASTATIN CALCIUM 10 MG/1
TABLET, COATED ORAL
Qty: 30 TABLET | Refills: 0 | Status: SHIPPED | OUTPATIENT
Start: 2020-07-14 | End: 2020-08-24 | Stop reason: SDUPTHER

## 2020-08-20 NOTE — TELEPHONE ENCOUNTER
LAST OV 06/12/2020 [facility]  NEXT OV NONE    losartan-hydroCHLOROthiazide (HYZAAR) 100-12.5 MG per tablet

## 2020-08-21 RX ORDER — LOSARTAN POTASSIUM AND HYDROCHLOROTHIAZIDE 12.5; 1 MG/1; MG/1
TABLET ORAL
Qty: 30 TABLET | Refills: 0 | Status: SHIPPED | OUTPATIENT
Start: 2020-08-21

## 2020-08-24 ENCOUNTER — OFFICE VISIT (OUTPATIENT)
Dept: CARDIOLOGY CLINIC | Age: 60
End: 2020-08-24
Payer: COMMERCIAL

## 2020-08-24 VITALS
BODY MASS INDEX: 27.07 KG/M2 | SYSTOLIC BLOOD PRESSURE: 124 MMHG | WEIGHT: 199.6 LBS | DIASTOLIC BLOOD PRESSURE: 60 MMHG | TEMPERATURE: 98.4 F | HEART RATE: 72 BPM

## 2020-08-24 PROCEDURE — 99213 OFFICE O/P EST LOW 20 MIN: CPT | Performed by: INTERNAL MEDICINE

## 2020-08-24 RX ORDER — ROSUVASTATIN CALCIUM 10 MG/1
TABLET, COATED ORAL
Qty: 30 TABLET | Refills: 2 | OUTPATIENT
Start: 2020-08-24

## 2020-08-24 RX ORDER — LOSARTAN POTASSIUM AND HYDROCHLOROTHIAZIDE 12.5; 1 MG/1; MG/1
TABLET ORAL
Qty: 30 TABLET | Refills: 2 | OUTPATIENT
Start: 2020-08-24

## 2020-08-24 RX ORDER — ROSUVASTATIN CALCIUM 10 MG/1
TABLET, COATED ORAL
Qty: 30 TABLET | Refills: 0 | Status: SHIPPED | OUTPATIENT
Start: 2020-08-24 | End: 2020-10-02

## 2020-08-24 ASSESSMENT — ENCOUNTER SYMPTOMS
WHEEZING: 0
ALLERGIC/IMMUNOLOGIC NEGATIVE: 1
CHOKING: 0
GASTROINTESTINAL NEGATIVE: 1
EYES NEGATIVE: 1
CHEST TIGHTNESS: 0
SHORTNESS OF BREATH: 0
STRIDOR: 0
APNEA: 0
COUGH: 0

## 2020-08-24 NOTE — PROGRESS NOTES
Subjective:      Patient ID: Vinayak Shahid is a 64 y.o. male    CC: s/p PCI     HPI:   Vinayak Shahid is a 66year old male with HX of HLP, HTN and CAD. He presents today for follow up from  (POBA distal LCx). 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 No Sob no orthopnea. Allergies   Allergen Reactions    Eggs Or Egg-Derived Products Other (See Comments)     stomach cramps       Social History     Socioeconomic History    Marital status: Legally      Spouse name: None    Number of children: None    Years of education: None    Highest education level: None   Occupational History    None   Social Needs    Financial resource strain: None    Food insecurity     Worry: None     Inability: None    Transportation needs     Medical: None     Non-medical: None   Tobacco Use    Smoking status: Current Every Day Smoker     Packs/day: 1.00    Smokeless tobacco: Never Used    Tobacco comment: started at age 27   Substance and Sexual Activity    Alcohol use: Yes     Comment: weekends     Drug use: Yes     Types: Marijuana     Comment: occ    Sexual activity: None   Lifestyle    Physical activity     Days per week: None     Minutes per session: None    Stress: None   Relationships    Social connections     Talks on phone: None     Gets together: None     Attends Sabianism service: None     Active member of club or organization: None     Attends meetings of clubs or organizations: None     Relationship status: None    Intimate partner violence     Fear of current or ex partner: None     Emotionally abused: None     Physically abused: None     Forced sexual activity: None   Other Topics Concern    None   Social History Narrative    None       No family history on file. has a past medical history of CAD (coronary artery disease). Review of Systems   Constitutional: Negative. HENT: Negative. Eyes: Negative.     Respiratory: Negative for apnea, cough, case he has dye allergy. Pigtail catheter in the left ventricle post angioplasty showed an EDP of  about 14 mmHg. The LV pressure was 167/5 and the AO pressure was  163/89. There was no significant gradient.     Please note that the start time of the procedure was 09:30 and the stop  time of the procedure was 10:53 a.m. He was given 4 mg of Versed and  200 mcg of fentanyl by my order in divided doses. He received heparin  during the procedure and an Aggrastat bolus. I did use 300 mL of  contrast during the procedure.     PLAN:  Hydration. Bedrest.  Angio-Seal was successfully deployed in the  right femoral arteriotomy. 600 of Plavix was given, 25 mg of Benadryl  was given and 4 mg Zofran was given. Risk factor modification. Beta  blockers and ARB administration.           Ifeoma Olmstead MD     D: 12/07/2018 11:35:09       T: 12/07/2018 12:40:29     [id number]  Job#: [id number]     Doc#: [id number]     CC:     Assessment:       Diagnosis Orders   1. Essential hypertension     2. NSTEMI (non-ST elevated myocardial infarction) ([facility]               Plan:      CAD- stable S/p PCTA-  HLP: ldl was high now on lipitor. Check labs  HTN- controlled with meds increase cozaar to 50 mg daily and now has HA so take 1/2 tab. Check labs. Continue current medications. Stable cardiovascular status.

## 2020-08-25 DIAGNOSIS — I10 ESSENTIAL HYPERTENSION: ICD-10-CM

## 2020-08-25 DIAGNOSIS — I21.4 NSTEMI (NON-ST ELEVATED MYOCARDIAL INFARCTION) (HCC): ICD-10-CM

## 2020-08-25 LAB
A/G RATIO: 1.8 (ref 1.1–2.2)
ALBUMIN SERPL-MCNC: 4.4 G/DL (ref 3.4–5)
ALP BLD-CCNC: 66 U/L (ref 40–129)
ALT SERPL-CCNC: 16 U/L (ref 10–40)
ANION GAP SERPL CALCULATED.3IONS-SCNC: 13 MMOL/L (ref 3–16)
AST SERPL-CCNC: 25 U/L (ref 15–37)
BASOPHILS ABSOLUTE: 0.1 K/UL (ref 0–0.2)
BASOPHILS RELATIVE PERCENT: 0.7 %
BILIRUB SERPL-MCNC: 0.4 MG/DL (ref 0–1)
BUN BLDV-MCNC: 27 MG/DL (ref 7–20)
CALCIUM SERPL-MCNC: 9.5 MG/DL (ref 8.3–10.6)
CHLORIDE BLD-SCNC: 107 MMOL/L (ref 99–110)
CHOLESTEROL, TOTAL: 137 MG/DL (ref 0–199)
CO2: 22 MMOL/L (ref 21–32)
CREAT SERPL-MCNC: 2.1 MG/DL (ref 0.9–1.3)
EOSINOPHILS ABSOLUTE: 0.1 K/UL (ref 0–0.6)
EOSINOPHILS RELATIVE PERCENT: 1.4 %
GFR AFRICAN AMERICAN: 39
GFR NON-AFRICAN AMERICAN: 33
GLOBULIN: 2.4 G/DL
GLUCOSE BLD-MCNC: 98 MG/DL (ref 70–99)
HCT VFR BLD CALC: 43.5 % (ref 40.5–52.5)
HDLC SERPL-MCNC: 33 MG/DL (ref 40–60)
HEMOGLOBIN: 14.8 G/DL (ref 13.5–17.5)
LDL CHOLESTEROL CALCULATED: 70 MG/DL
LYMPHOCYTES ABSOLUTE: 2.5 K/UL (ref 1–5.1)
LYMPHOCYTES RELATIVE PERCENT: 33.3 %
MCH RBC QN AUTO: 32.6 PG (ref 26–34)
MCHC RBC AUTO-ENTMCNC: 33.9 G/DL (ref 31–36)
MCV RBC AUTO: 96.1 FL (ref 80–100)
MONOCYTES ABSOLUTE: 0.6 K/UL (ref 0–1.3)
MONOCYTES RELATIVE PERCENT: 7.8 %
NEUTROPHILS ABSOLUTE: 4.2 K/UL (ref 1.7–7.7)
NEUTROPHILS RELATIVE PERCENT: 56.8 %
PDW BLD-RTO: 13.3 % (ref 12.4–15.4)
PLATELET # BLD: 242 K/UL (ref 135–450)
PMV BLD AUTO: 7.7 FL (ref 5–10.5)
POTASSIUM SERPL-SCNC: 4.1 MMOL/L (ref 3.5–5.1)
RBC # BLD: 4.53 M/UL (ref 4.2–5.9)
SODIUM BLD-SCNC: 142 MMOL/L (ref 136–145)
TOTAL PROTEIN: 6.8 G/DL (ref 6.4–8.2)
TRIGL SERPL-MCNC: 169 MG/DL (ref 0–150)
VLDLC SERPL CALC-MCNC: 34 MG/DL
WBC # BLD: 7.4 K/UL (ref 4–11)

## 2020-08-28 ENCOUNTER — NURSE ONLY (OUTPATIENT)
Dept: CARDIOLOGY CLINIC | Age: 60
End: 2020-08-28

## 2020-09-04 DIAGNOSIS — I10 ESSENTIAL HYPERTENSION: ICD-10-CM

## 2020-09-04 LAB
ANION GAP SERPL CALCULATED.3IONS-SCNC: 11 MMOL/L (ref 3–16)
BUN BLDV-MCNC: 17 MG/DL (ref 7–20)
CALCIUM SERPL-MCNC: 9.3 MG/DL (ref 8.3–10.6)
CHLORIDE BLD-SCNC: 109 MMOL/L (ref 99–110)
CO2: 23 MMOL/L (ref 21–32)
CREAT SERPL-MCNC: 2.1 MG/DL (ref 0.9–1.3)
GFR AFRICAN AMERICAN: 39
GFR NON-AFRICAN AMERICAN: 33
GLUCOSE BLD-MCNC: 110 MG/DL (ref 70–99)
POTASSIUM SERPL-SCNC: 4.3 MMOL/L (ref 3.5–5.1)
SODIUM BLD-SCNC: 143 MMOL/L (ref 136–145)

## 2020-09-08 ENCOUNTER — TELEPHONE (OUTPATIENT)
Dept: CARDIOLOGY CLINIC | Age: 60
End: 2020-09-08

## 2020-09-08 NOTE — TELEPHONE ENCOUNTER
Left voicemail message to return call concerning BMP results and to refer pt to Dr Audelia Membreno [phone]

## 2020-09-09 NOTE — TELEPHONE ENCOUNTER
Patient returned call to Tobias Hinson about lab results. Per patient request e-mailed Dr. Aditi Collins name and number to him.

## 2020-10-02 RX ORDER — ROSUVASTATIN CALCIUM 10 MG/1
TABLET, COATED ORAL
Qty: 30 TABLET | Refills: 0 | Status: SHIPPED | OUTPATIENT
Start: 2020-10-02 | End: 2020-11-04 | Stop reason: SDUPTHER

## 2020-10-02 RX ORDER — ROSUVASTATIN CALCIUM 10 MG/1
TABLET, COATED ORAL
Qty: 30 TABLET | Refills: 0 | Status: SHIPPED | OUTPATIENT
Start: 2020-10-02 | End: 2020-10-02

## 2020-11-04 ENCOUNTER — HOSPITAL ENCOUNTER (OUTPATIENT)
Dept: ULTRASOUND IMAGING | Age: 60
Discharge: HOME | End: 2020-11-04

## 2020-11-04 PROCEDURE — 76770 US EXAM ABDO BACK WALL COMP: CPT

## 2020-11-05 RX ORDER — ROSUVASTATIN CALCIUM 10 MG/1
10 TABLET, COATED ORAL DAILY
Qty: 30 TABLET | Refills: 3 | Status: SHIPPED | OUTPATIENT
Start: 2020-11-05 | End: 2020-11-06 | Stop reason: SDUPTHER

## 2020-11-06 RX ORDER — ROSUVASTATIN CALCIUM 10 MG/1
20 TABLET, COATED ORAL DAILY
Qty: 60 TABLET | Refills: 3 | Status: SHIPPED | OUTPATIENT
Start: 2020-11-06 | End: 2022-05-09 | Stop reason: SDUPTHER

## 2020-12-11 DIAGNOSIS — N18.32 STAGE 3B CHRONIC KIDNEY DISEASE (HCC): ICD-10-CM

## 2020-12-11 LAB
ALBUMIN SERPL-MCNC: 4.5 G/DL (ref 3.4–5)
ANION GAP SERPL CALCULATED.3IONS-SCNC: 13 MMOL/L (ref 3–16)
BUN BLDV-MCNC: 14 MG/DL (ref 7–20)
CALCIUM SERPL-MCNC: 9.5 MG/DL (ref 8.3–10.6)
CHLORIDE BLD-SCNC: 106 MMOL/L (ref 99–110)
CO2: 23 MMOL/L (ref 21–32)
CREAT SERPL-MCNC: 1.6 MG/DL (ref 0.9–1.3)
CREATININE URINE: 10.2 MG/DL (ref 39–259)
GFR AFRICAN AMERICAN: 54
GFR NON-AFRICAN AMERICAN: 44
GLUCOSE BLD-MCNC: 112 MG/DL (ref 70–99)
MICROALBUMIN UR-MCNC: 149.1 MG/DL
MICROALBUMIN/CREAT UR-RTO: ABNORMAL MG/G (ref 0–30)
PHOSPHORUS: 2.8 MG/DL (ref 2.5–4.9)
POTASSIUM SERPL-SCNC: 4.5 MMOL/L (ref 3.5–5.1)
SODIUM BLD-SCNC: 142 MMOL/L (ref 136–145)

## 2020-12-15 LAB
KAPPA, FREE LIGHT CHAINS, SERUM: 17.2 MG/L (ref 3.3–19.4)
KAPPA/LAMBDA RATIO: 1.07 (ref 0.26–1.65)
KAPPA/LAMBDA TEST COMMENT: NORMAL
LAMBDA, FREE LIGHT CHAINS, SERUM: 16.09 MG/L (ref 5.71–26.3)

## 2021-03-16 RX ORDER — ROSUVASTATIN CALCIUM 10 MG/1
10 TABLET, COATED ORAL DAILY
Qty: 90 TABLET | Refills: 1 | Status: SHIPPED | OUTPATIENT
Start: 2021-03-16 | End: 2021-09-01 | Stop reason: SDUPTHER

## 2021-03-16 RX ORDER — FAMOTIDINE 20 MG/1
20 TABLET, FILM COATED ORAL 2 TIMES DAILY
Qty: 60 TABLET | Refills: 3 | Status: SHIPPED | OUTPATIENT
Start: 2021-03-16 | End: 2021-08-02

## 2021-03-16 RX ORDER — CLOPIDOGREL BISULFATE 75 MG/1
75 TABLET ORAL DAILY
Qty: 30 TABLET | Refills: 3 | Status: SHIPPED | OUTPATIENT
Start: 2021-03-16 | End: 2021-08-02

## 2021-03-27 RX ORDER — METOPROLOL SUCCINATE 25 MG/1
TABLET, EXTENDED RELEASE ORAL
Qty: 30 TABLET | Refills: 0 | Status: SHIPPED | OUTPATIENT
Start: 2021-03-27 | End: 2021-09-01 | Stop reason: SDUPTHER

## 2021-03-29 ENCOUNTER — OFFICE VISIT (OUTPATIENT)
Dept: CARDIOLOGY CLINIC | Age: 61
End: 2021-03-29
Payer: COMMERCIAL

## 2021-03-29 VITALS
WEIGHT: 210.4 LBS | TEMPERATURE: 97.3 F | SYSTOLIC BLOOD PRESSURE: 130 MMHG | DIASTOLIC BLOOD PRESSURE: 70 MMHG | HEART RATE: 86 BPM | BODY MASS INDEX: 28.54 KG/M2

## 2021-03-29 DIAGNOSIS — I21.4 NSTEMI (NON-ST ELEVATED MYOCARDIAL INFARCTION) (HCC): Primary | ICD-10-CM

## 2021-03-29 DIAGNOSIS — E78.5 HYPERLIPIDEMIA, UNSPECIFIED HYPERLIPIDEMIA TYPE: ICD-10-CM

## 2021-03-29 DIAGNOSIS — I25.10 CORONARY ARTERY DISEASE INVOLVING NATIVE CORONARY ARTERY OF NATIVE HEART WITHOUT ANGINA PECTORIS: ICD-10-CM

## 2021-03-29 DIAGNOSIS — I10 HTN (HYPERTENSION), BENIGN: ICD-10-CM

## 2021-03-29 PROCEDURE — 99213 OFFICE O/P EST LOW 20 MIN: CPT | Performed by: INTERNAL MEDICINE

## 2021-03-29 ASSESSMENT — ENCOUNTER SYMPTOMS
CHEST TIGHTNESS: 0
SHORTNESS OF BREATH: 0
WHEEZING: 0
COUGH: 0
APNEA: 0
EYES NEGATIVE: 1
ALLERGIC/IMMUNOLOGIC NEGATIVE: 1
STRIDOR: 0
GASTROINTESTINAL NEGATIVE: 1
CHOKING: 0

## 2021-03-29 NOTE — PROGRESS NOTES
Subjective:      Patient ID: Loren Scott is a 63 y.o. male    CC: s/p PCI     HPI:   Loren Scott is a 66year old male with HX of HLP, HTN and CAD. He presents today for follow up from  (POBA distal LCx). 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 No Sob no orthopnea. Allergies   Allergen Reactions    Eggs Or Egg-Derived Products Other (See Comments)     stomach cramps       Social History     Socioeconomic History    Marital status: Legally      Spouse name: None    Number of children: None    Years of education: None    Highest education level: None   Occupational History    None   Social Needs    Financial resource strain: None    Food insecurity     Worry: None     Inability: None    Transportation needs     Medical: None     Non-medical: None   Tobacco Use    Smoking status: Current Every Day Smoker     Packs/day: 1.00    Smokeless tobacco: Never Used    Tobacco comment: started at age 27   Substance and Sexual Activity    Alcohol use: Yes     Comment: weekends     Drug use: Yes     Types: Marijuana     Comment: occ    Sexual activity: None   Lifestyle    Physical activity     Days per week: None     Minutes per session: None    Stress: None   Relationships    Social connections     Talks on phone: None     Gets together: None     Attends Gnosticism service: None     Active member of club or organization: None     Attends meetings of clubs or organizations: None     Relationship status: None    Intimate partner violence     Fear of current or ex partner: None     Emotionally abused: None     Physically abused: None     Forced sexual activity: None   Other Topics Concern    None   Social History Narrative    None       No family history on file. has a past medical history of CAD (coronary artery disease). Review of Systems   Constitutional: Negative. HENT: Negative. Eyes: Negative.     Respiratory: Negative for apnea, cough, choking, chest tightness, shortness of breath, wheezing and stridor. Cardiovascular: Negative for chest pain, palpitations and leg swelling. Gastrointestinal: Negative. Endocrine: Negative. Genitourinary: Negative. Musculoskeletal: Negative. Skin: Negative. Allergic/Immunologic: Negative. Neurological: Negative. Hematological: Negative. Psychiatric/Behavioral: Negative. Vitals:    03/29/21 1420   BP: 130/70   Pulse: 86   Temp: 97.3 °F (36.3 °C)           Objective:   Physical Exam  Constitutional:       General: He is not in acute distress. Appearance: He is well-developed. He is not diaphoretic. HENT:      Head: Normocephalic and atraumatic. Eyes:      General:         Right eye: No discharge. Left eye: No discharge. Conjunctiva/sclera: Conjunctivae normal.      Pupils: Pupils are equal, round, and reactive to light. Neck:      Musculoskeletal: Normal range of motion and neck supple. Cardiovascular:      Rate and Rhythm: Normal rate and regular rhythm. Heart sounds: Normal heart sounds. No murmur. Pulmonary:      Effort: Pulmonary effort is normal.      Breath sounds: Normal breath sounds. Abdominal:      General: Bowel sounds are normal. There is no distension. Palpations: Abdomen is soft. Musculoskeletal: Normal range of motion. General: No deformity. Skin:     General: Skin is warm and dry. Neurological:      Mental Status: He is alert and oriented to person, place, and time.          Current Outpatient Medications   Medication Sig Dispense Refill    metoprolol succinate (TOPROL XL) 25 MG extended release tablet Take 1 tablet by mouth once daily 30 tablet 0    amLODIPine (NORVASC) 5 MG tablet Take 1 tablet by mouth daily 90 tablet 1    clopidogrel (PLAVIX) 75 MG tablet Take 1 tablet by mouth daily 30 tablet 3    rosuvastatin (CRESTOR) 10 MG tablet Take 1 tablet by mouth daily 90 tablet 1    famotidine (PEPCID) 20 MG tablet Take 1 tablet by mouth 2 times daily 60 tablet 3    famotidine (PEPCID) 20 MG tablet Take 1 tablet by mouth 2 times daily (Patient taking differently: Take 20 mg by mouth Take one tab twice a day every other day) 180 tablet 3    nicotine (NICODERM CQ) 14 MG/24HR Place 1 patch onto the skin daily 30 patch 0    acetaminophen (TYLENOL) 325 MG tablet Take 650 mg by mouth every 6 hours as needed for Pain       No current facility-administered medications for this visit. Vitals:    21 1420   BP: 130/70   Pulse: 86   Temp: 97.3 °F (36.3 °C)     [facility] [place] [address] [place] [id number]                             CARDIAC CATHETERIZATION     PATIENT NAME: Alejandra Calderón                     :        1960  MED REC NO:   [id number]                          ROOM:       [facility]  ACCOUNT NO:   [de-identified]                           ADMIT DATE: 2018  PROVIDER:     Janki Orellana. Caridad Porter MD     DATE OF PROCEDURE:  2018     INDICATION:  This patient presented to the hospital yesterday evening  with chest pain. His troponins trended up from 0.08 to 2.2. His EKG  was nondiagnostic for MI, but he developed ST-segment changes in the  inferior leads. For this reason, he presents for angiography. He has  class III angina. He is on heparin drip.     PROCEDURE IN DETAIL:  Prepped and draped in a sterile manner, locally  anesthetized with 2% lidocaine right, femoral artery area. A 6-French  sheath was placed in retrograde manner, right femoral artery over  guidewire. The 5-French JL-4, JR-4, and pigtail catheters were used  during the diagnostic procedure. All were aspirated and flushed prior  to use.   All catheters were advanced under fluoroscopic guidance over  the guidewire and retracted over the guidewire.     FINDINGS:  The left main coronary artery was normal.  Left anterior  descending coronary artery is normal.  Diagonal branch is normal. Septal  is normal.  The LAD courses around the apex in the  left ventricle without focal obstruction. True circumflex proximally is  normal.  Obtuse marginal is normal.  The distal circumflex bifurcates  into two very small vessels and they appeared to be subtotally occluded.     JR-4 catheter engages the right coronary artery. That was a normal  dominant right coronary artery. Right posterior descending is normal.   Right posterolateral branch is normal.  There is no collateral supply or  right-to-left or left-to-right collaterals seen.     It was elected to proceed with PCI upon the distal left circumflex. Upfront, I realized that this was going to be a difficult angioplasty  because the vessels involved were very small.     XB 3.0 guide was used. A 190 BMW guidewire was reshaped and reshaped  and eventually it was placed into the distal circumflex. I took a 2.0  balloon, inflated it to 8 atmospheres x30 seconds. I did establish flow  in the more medial branch. I had to use a second wire. A second BMW  guidewire was advanced. I _____ often balloon the other branch artery  and then I put a second BMW guidewire down. I used a 1.5 mm balloon. A  1.5 mm balloon was placed in the more lateral branch and inflations  occurred to 8 atmospheres with repositioning x3. There was TAWANA-3 blood  flow in the lateral branch. There was TAWANA 1 to 2 flow in the more  medial branch. I elected to conclude the procedure at that juncture. The ACT was 200 seconds. At the end, he had received heparin per  protocol. I started an Aggrastat drip at the end. The reason why I did  not use it upfront was I felt like I was working on such small vessels  that I was concerned about perforation, and so I did not want to _____  upfront.     Interestingly, the patient was pain free at the conclusion of procedure.   There was no rash in the body, he had no problems controlling his  saliva; however, he was developing johann. I gave him Zofran 4 mg IV  push and I did treat him with Benadryl 25 mg in case he has dye allergy. Pigtail catheter in the left ventricle post angioplasty showed an EDP of  about 14 mmHg. The LV pressure was 167/5 and the AO pressure was  163/89. There was no significant gradient.     Please note that the start time of the procedure was 09:30 and the stop  time of the procedure was 10:53 a.m. He was given 4 mg of Versed and  200 mcg of fentanyl by my order in divided doses. He received heparin  during the procedure and an Aggrastat bolus. I did use 300 mL of  contrast during the procedure.     PLAN:  Hydration. Bedrest.  Angio-Seal was successfully deployed in the  right femoral arteriotomy. 600 of Plavix was given, 25 mg of Benadryl  was given and 4 mg Zofran was given. Risk factor modification. Beta  blockers and ARB administration.           Byron Crowe MD     D: 12/07/2018 11:35:09       T: 12/07/2018 12:40:29     [email or web address]  Job#: [id number]     Doc#: [id number]     CC:     Assessment:      Diagnosis Orders   1. NSTEMI (non-ST elevated myocardial infarction) ([facility]     2. Coronary artery disease involving native coronary artery of native heart without angina pectoris     3. HTN (hypertension), benign     4. Hyperlipidemia, unspecified hyperlipidemia type         Plan:      CAD- stable S/p PCTA-  HLP: ldl was high now on lipitor. Check labs  HTN- controlled with meds increase cozaar to 50 mg daily and now has HA so take 1/2 tab. CRI  1.7 creatinine. Continue current medications. Stable cardiovascular status.

## 2021-09-01 ENCOUNTER — OFFICE VISIT (OUTPATIENT)
Dept: CARDIOLOGY CLINIC | Age: 61
End: 2021-09-01
Payer: COMMERCIAL

## 2021-09-01 VITALS
DIASTOLIC BLOOD PRESSURE: 72 MMHG | HEART RATE: 80 BPM | SYSTOLIC BLOOD PRESSURE: 136 MMHG | BODY MASS INDEX: 28.1 KG/M2 | WEIGHT: 207.2 LBS

## 2021-09-01 DIAGNOSIS — I25.10 CORONARY ARTERY DISEASE INVOLVING NATIVE CORONARY ARTERY OF NATIVE HEART WITHOUT ANGINA PECTORIS: Primary | ICD-10-CM

## 2021-09-01 DIAGNOSIS — E78.5 HYPERLIPIDEMIA, UNSPECIFIED HYPERLIPIDEMIA TYPE: ICD-10-CM

## 2021-09-01 DIAGNOSIS — I10 HTN (HYPERTENSION), BENIGN: ICD-10-CM

## 2021-09-01 PROCEDURE — 99214 OFFICE O/P EST MOD 30 MIN: CPT | Performed by: INTERNAL MEDICINE

## 2021-09-01 RX ORDER — AMLODIPINE BESYLATE 5 MG/1
5 TABLET ORAL DAILY
Qty: 90 TABLET | Refills: 3 | Status: SHIPPED | OUTPATIENT
Start: 2021-09-01 | End: 2021-11-15

## 2021-09-01 RX ORDER — METOPROLOL SUCCINATE 25 MG/1
25 TABLET, EXTENDED RELEASE ORAL DAILY
Qty: 90 TABLET | Refills: 3 | Status: SHIPPED | OUTPATIENT
Start: 2021-09-01 | End: 2022-08-10

## 2021-09-01 RX ORDER — CLOPIDOGREL BISULFATE 75 MG/1
TABLET ORAL
Qty: 90 TABLET | Refills: 1 | Status: SHIPPED | OUTPATIENT
Start: 2021-09-01 | End: 2021-09-10

## 2021-09-01 RX ORDER — ROSUVASTATIN CALCIUM 10 MG/1
10 TABLET, COATED ORAL DAILY
Qty: 90 TABLET | Refills: 3 | Status: SHIPPED | OUTPATIENT
Start: 2021-09-01 | End: 2021-10-27 | Stop reason: ALTCHOICE

## 2021-09-01 ASSESSMENT — ENCOUNTER SYMPTOMS
SHORTNESS OF BREATH: 0
WHEEZING: 0
APNEA: 0
ALLERGIC/IMMUNOLOGIC NEGATIVE: 1
STRIDOR: 0
CHEST TIGHTNESS: 0
EYES NEGATIVE: 1
CHOKING: 0
GASTROINTESTINAL NEGATIVE: 1
COUGH: 0

## 2021-09-01 NOTE — PROGRESS NOTES
history on file. has a past medical history of CAD (coronary artery disease). Review of Systems   Constitutional: Negative. HENT: Negative. Eyes: Negative. Respiratory: Negative for apnea, cough, choking, chest tightness, shortness of breath, wheezing and stridor. Cardiovascular: Negative for chest pain, palpitations and leg swelling. Gastrointestinal: Negative. Endocrine: Negative. Genitourinary: Negative. Musculoskeletal: Negative. Skin: Negative. Allergic/Immunologic: Negative. Neurological: Negative. Hematological: Negative. Psychiatric/Behavioral: Negative. Vitals:    09/01/21 1321   BP: 136/72   Pulse: 80         Exam unchanged from 8/25/20. Objective:   Physical Exam  Constitutional:       General: He is not in acute distress. Appearance: He is well-developed. He is not diaphoretic. HENT:      Head: Normocephalic and atraumatic. Eyes:      General:         Right eye: No discharge. Left eye: No discharge. Conjunctiva/sclera: Conjunctivae normal.      Pupils: Pupils are equal, round, and reactive to light. Cardiovascular:      Rate and Rhythm: Normal rate and regular rhythm. Heart sounds: Normal heart sounds. No murmur heard. Pulmonary:      Effort: Pulmonary effort is normal.      Breath sounds: Normal breath sounds. Abdominal:      General: Bowel sounds are normal. There is no distension. Palpations: Abdomen is soft. Musculoskeletal:         General: No deformity. Normal range of motion. Cervical back: Normal range of motion and neck supple. Skin:     General: Skin is warm and dry. Neurological:      Mental Status: He is alert and oriented to person, place, and time.          Current Outpatient Medications   Medication Sig Dispense Refill    famotidine (PEPCID) 20 MG tablet Take 1 tablet by mouth twice daily 60 tablet 0    clopidogrel (PLAVIX) 75 MG tablet Take 1 tablet by mouth once daily 30 tablet 0    amLODIPine (NORVASC) 5 MG tablet Take 1 tablet by mouth once daily 90 tablet 0    metoprolol succinate (TOPROL XL) 25 MG extended release tablet Take 1 tablet by mouth once daily 30 tablet 0    rosuvastatin (CRESTOR) 10 MG tablet Take 1 tablet by mouth daily 90 tablet 1    famotidine (PEPCID) 20 MG tablet Take 1 tablet by mouth 2 times daily (Patient taking differently: Take 20 mg by mouth Take one tab twice a day every other day) 180 tablet 3    nicotine (NICODERM CQ) 14 MG/24HR Place 1 patch onto the skin daily 30 patch 0    acetaminophen (TYLENOL) 325 MG tablet Take 650 mg by mouth every 6 hours as needed for Pain       No current facility-administered medications for this visit. Vitals:    21 1321   BP: 136/72   Pulse: [address], [facility] [place] [address]                             CARDIAC CATHETERIZATION     PATIENT NAME: Chele Yoon                     :        1960  MED REC NO:   [id number]                          ROOM:       4503  ACCOUNT NO:   [de-identified]                           ADMIT DATE: 2018  PROVIDER:     Pilar Vazquez. Darryl Burkett MD     DATE OF PROCEDURE:  2018     INDICATION:  This patient presented to the hospital yesterday evening  with chest pain. His troponins trended up from 0.08 to 2.2. His EKG  was nondiagnostic for MI, but he developed ST-segment changes in the  inferior leads. For this reason, he presents for angiography. He has  class III angina. He is on heparin drip.     PROCEDURE IN DETAIL:  Prepped and draped in a sterile manner, locally  anesthetized with 2% lidocaine right, femoral artery area. A 6-French  sheath was placed in retrograde manner, right femoral artery over  guidewire. The 5-French JL-4, JR-4, and pigtail catheters were used  during the diagnostic procedure. All were aspirated and flushed prior  to use.   All catheters were advanced under fluoroscopic guidance over  the guidewire and retracted over the guidewire.     FINDINGS:  The left main coronary artery was normal.  Left anterior  descending coronary artery is normal.  Diagonal branch is normal.   Septal  is normal.  The LAD courses around the apex in the  left ventricle without focal obstruction. True circumflex proximally is  normal.  Obtuse marginal is normal.  The distal circumflex bifurcates  into two very small vessels and they appeared to be subtotally occluded.     JR-4 catheter engages the right coronary artery. That was a normal  dominant right coronary artery. Right posterior descending is normal.   Right posterolateral branch is normal.  There is no collateral supply or  right-to-left or left-to-right collaterals seen.     It was elected to proceed with PCI upon the distal left circumflex. Upfront, I realized that this was going to be a difficult angioplasty  because the vessels involved were very small.     XB 3.0 guide was used. A 190 [employer] guidewire was reshaped and reshaped  and eventually it was placed into the distal circumflex. I took a 2.0  balloon, inflated it to 8 atmospheres x30 seconds. I did establish flow  in the more medial branch. I had to use a second wire. A second [employer]  guidewire was advanced. I _____ often balloon the other branch artery  and then I put a second BMW guidewire down. I used a 1.5 mm balloon. A  1.5 mm balloon was placed in the more lateral branch and inflations  occurred to 8 atmospheres with repositioning x3. There was TAWANA-3 blood  flow in the lateral branch. There was TAWANA 1 to 2 flow in the more  medial branch. I elected to conclude the procedure at that juncture. The ACT was 200 seconds. At the end, he had received heparin per  protocol. I started an Aggrastat drip at the end.   The reason why I did  not use it upfront was I felt like I was working on such small vessels  that I was concerned about perforation, and so I did not want to

## 2021-10-27 ENCOUNTER — OFFICE VISIT (OUTPATIENT)
Dept: CARDIOLOGY CLINIC | Age: 61
End: 2021-10-27
Payer: COMMERCIAL

## 2021-10-27 VITALS
WEIGHT: 210.8 LBS | HEIGHT: 72 IN | HEART RATE: 70 BPM | SYSTOLIC BLOOD PRESSURE: 122 MMHG | OXYGEN SATURATION: 97 % | BODY MASS INDEX: 28.55 KG/M2 | DIASTOLIC BLOOD PRESSURE: 80 MMHG

## 2021-10-27 DIAGNOSIS — E78.2 MIXED HYPERLIPIDEMIA: ICD-10-CM

## 2021-10-27 DIAGNOSIS — I10 HYPERTENSION, UNSPECIFIED TYPE: ICD-10-CM

## 2021-10-27 DIAGNOSIS — I25.10 CORONARY ARTERY DISEASE INVOLVING NATIVE CORONARY ARTERY OF NATIVE HEART WITHOUT ANGINA PECTORIS: Primary | ICD-10-CM

## 2021-10-27 PROCEDURE — 99214 OFFICE O/P EST MOD 30 MIN: CPT | Performed by: INTERNAL MEDICINE

## 2021-10-27 RX ORDER — CLOPIDOGREL BISULFATE 75 MG/1
75 TABLET ORAL DAILY
Qty: 30 TABLET | Refills: 2 | Status: SHIPPED | OUTPATIENT
Start: 2021-10-27 | End: 2022-01-26

## 2021-10-27 RX ORDER — ROSUVASTATIN CALCIUM 10 MG/1
10 TABLET, COATED ORAL DAILY
Qty: 30 TABLET | Refills: 2 | Status: SHIPPED | OUTPATIENT
Start: 2021-10-27 | End: 2021-12-14

## 2021-10-27 ASSESSMENT — ENCOUNTER SYMPTOMS
EYES NEGATIVE: 1
CHOKING: 0
APNEA: 0
GASTROINTESTINAL NEGATIVE: 1
CHEST TIGHTNESS: 0
WHEEZING: 0
SHORTNESS OF BREATH: 0
STRIDOR: 0
ALLERGIC/IMMUNOLOGIC NEGATIVE: 1
COUGH: 0

## 2021-10-27 NOTE — PROGRESS NOTES
Abused:     Sexually Abused:        No family history on file. has a past medical history of CAD (coronary artery disease). Review of Systems   Constitutional: Negative. HENT: Negative. Eyes: Negative. Respiratory: Negative for apnea, cough, choking, chest tightness, shortness of breath, wheezing and stridor. Cardiovascular: Negative for chest pain, palpitations and leg swelling. Gastrointestinal: Negative. Endocrine: Negative. Genitourinary: Negative. Musculoskeletal: Negative. Skin: Negative. Allergic/Immunologic: Negative. Neurological: Negative. Hematological: Negative. Psychiatric/Behavioral: Negative. Vitals:    10/27/21 1431   BP: 122/80   Pulse: 70   SpO2: 97%           Objective:   Physical Exam  Constitutional:       General: He is not in acute distress. Appearance: He is well-developed. He is not diaphoretic. HENT:      Head: Normocephalic and atraumatic. Eyes:      General:         Right eye: No discharge. Left eye: No discharge. Conjunctiva/sclera: Conjunctivae normal.      Pupils: Pupils are equal, round, and reactive to light. Cardiovascular:      Rate and Rhythm: Normal rate and regular rhythm. Heart sounds: Normal heart sounds. No murmur heard. Pulmonary:      Effort: Pulmonary effort is normal.      Breath sounds: Normal breath sounds. Abdominal:      General: Bowel sounds are normal. There is no distension. Palpations: Abdomen is soft. Musculoskeletal:         General: No deformity. Normal range of motion. Cervical back: Normal range of motion and neck supple. Skin:     General: Skin is warm and dry. Neurological:      Mental Status: He is alert and oriented to person, place, and time.          Current Outpatient Medications   Medication Sig Dispense Refill    clopidogrel (PLAVIX) 75 MG tablet Take 1 tablet by mouth once daily 30 tablet 0    metoprolol succinate (TOPROL XL) 25 MG extended release tablet Take 1 tablet by mouth daily 90 tablet 3    amLODIPine (NORVASC) 5 MG tablet Take 1 tablet by mouth daily 90 tablet 3    acetaminophen (TYLENOL) 325 MG tablet Take 650 mg by mouth every 6 hours as needed for Pain       No current facility-administered medications for this visit. Vitals:    10/27/21 1431   BP: 122/80   Pulse: 70   SpO2: 97%     [address] [address]                             CARDIAC CATHETERIZATION     PATIENT NAME: Saray Mckee                     :        1960  MED REC NO:   [id number]                          ROOM:       4503  ACCOUNT NO:   [de-identified]                           ADMIT DATE: 2018  PROVIDER:     Mila Gunter. Agustina Roman MD     DATE OF PROCEDURE:  2018     INDICATION:  This patient presented to the hospital yesterday evening  with chest pain. His troponins trended up from 0.08 to 2.2. His EKG  was nondiagnostic for MI, but he developed ST-segment changes in the  inferior leads. For this reason, he presents for angiography. He has  class III angina. He is on heparin drip.     PROCEDURE IN DETAIL:  Prepped and draped in a sterile manner, locally  anesthetized with 2% lidocaine right, femoral artery area. A 6-French  sheath was placed in retrograde manner, right femoral artery over  guidewire. The 5-French JL-4, JR-4, and pigtail catheters were used  during the diagnostic procedure. All were aspirated and flushed prior  to use. All catheters were advanced under fluoroscopic guidance over  the guidewire and retracted over the guidewire.     FINDINGS:  The left main coronary artery was normal.  Left anterior  descending coronary artery is normal.  Diagonal branch is normal.   Septal  is normal.  The LAD courses around the apex in the  left ventricle without focal obstruction.   True circumflex proximally is  normal.  Obtuse marginal is normal.  The distal circumflex bifurcates  into two very small vessels and they appeared to be subtotally occluded.     JR-4 catheter engages the right coronary artery. That was a normal  dominant right coronary artery. Right posterior descending is normal.   Right posterolateral branch is normal.  There is no collateral supply or  right-to-left or left-to-right collaterals seen.     It was elected to proceed with PCI upon the distal left circumflex. Upfront, I realized that this was going to be a difficult angioplasty  because the vessels involved were very small.     XB 3.0 guide was used. A 190 [employer] guidewire was reshaped and reshaped  and eventually it was placed into the distal circumflex. I took a 2.0  balloon, inflated it to 8 atmospheres x30 seconds. I did establish flow  in the more medial branch. I had to use a second wire. A second [employer]  guidewire was advanced. I _____ often balloon the other branch artery  and then I put a second [employer] guidewire down. I used a 1.5 mm balloon. A  1.5 mm balloon was placed in the more lateral branch and inflations  occurred to 8 atmospheres with repositioning x3. There was TAWANA-3 blood  flow in the lateral branch. There was TAWANA 1 to 2 flow in the more  medial branch. I elected to conclude the procedure at that juncture. The ACT was 200 seconds. At the end, he had received heparin per  protocol. I started an Aggrastat drip at the end. The reason why I did  not use it upfront was I felt like I was working on such small vessels  that I was concerned about perforation, and so I did not want to _____  upfront.     Interestingly, the patient was pain free at the conclusion of procedure. There was no rash in the body, he had no problems controlling his  saliva; however, he was developing singultus. I gave him Zofran 4 mg IV  push and I did treat him with Benadryl 25 mg in case he has dye allergy.   Pigtail catheter in the left ventricle post angioplasty showed an EDP of  about 14 mmHg.  The LV pressure was 167/5 and the AO pressure was  163/89. There was no significant gradient.     Please note that the start time of the procedure was 09:30 and the stop  time of the procedure was 10:53 a.m. He was given 4 mg of Versed and  200 mcg of fentanyl by my order in divided doses. He received heparin  during the procedure and an Aggrastat bolus. I did use 300 mL of  contrast during the procedure.     PLAN:  Hydration. Bedrest.  Angio-Seal was successfully deployed in the  right femoral arteriotomy. 600 of Plavix was given, 25 mg of Benadryl  was given and 4 mg Zofran was given. Risk factor modification. Beta  blockers and ARB administration.           Jena Weathers MD     D: 12/07/2018 11:35:09       T: 12/07/2018 12:40:29     [email or web address]  Job#: [id number]     Doc#: [id number]     CC:     Assessment:       Diagnosis Orders   1. Coronary artery disease involving native coronary artery of native heart without angina pectoris  CBC WITH AUTO DIFFERENTIAL    Comprehensive Metabolic Panel    LIPID PANEL   2. Mixed hyperlipidemia  CBC WITH AUTO DIFFERENTIAL    Comprehensive Metabolic Panel    LIPID PANEL   3. Hypertension, unspecified type  CBC WITH AUTO DIFFERENTIAL    Comprehensive Metabolic Panel    LIPID PANEL            Plan:      CAD- stable S/p PCTA-  HLP: ldl was high now on lipitor. Check labs  HTN- controlled with meds increase cozaar to 50 mg daily and now has HA so take 1/2 tab. Check labs. NEED LABS. Continue current medications. Stable cardiovascular status.

## 2021-11-03 DIAGNOSIS — I25.10 CORONARY ARTERY DISEASE INVOLVING NATIVE CORONARY ARTERY OF NATIVE HEART WITHOUT ANGINA PECTORIS: ICD-10-CM

## 2021-11-03 DIAGNOSIS — E78.2 MIXED HYPERLIPIDEMIA: ICD-10-CM

## 2021-11-03 DIAGNOSIS — I10 HYPERTENSION, UNSPECIFIED TYPE: ICD-10-CM

## 2021-11-03 LAB
A/G RATIO: 1.8 (ref 1.1–2.2)
ALBUMIN SERPL-MCNC: 4.8 G/DL (ref 3.4–5)
ALP BLD-CCNC: 78 U/L (ref 40–129)
ALT SERPL-CCNC: 13 U/L (ref 10–40)
ANION GAP SERPL CALCULATED.3IONS-SCNC: 15 MMOL/L (ref 3–16)
AST SERPL-CCNC: 21 U/L (ref 15–37)
BASOPHILS ABSOLUTE: 0.1 K/UL (ref 0–0.2)
BASOPHILS RELATIVE PERCENT: 0.7 %
BILIRUB SERPL-MCNC: 0.3 MG/DL (ref 0–1)
BUN BLDV-MCNC: 21 MG/DL (ref 7–20)
CALCIUM SERPL-MCNC: 10.1 MG/DL (ref 8.3–10.6)
CHLORIDE BLD-SCNC: 104 MMOL/L (ref 99–110)
CHOLESTEROL, TOTAL: 231 MG/DL (ref 0–199)
CO2: 24 MMOL/L (ref 21–32)
CREAT SERPL-MCNC: 2.1 MG/DL (ref 0.9–1.3)
EOSINOPHILS ABSOLUTE: 0.1 K/UL (ref 0–0.6)
EOSINOPHILS RELATIVE PERCENT: 0.9 %
GFR AFRICAN AMERICAN: 39
GFR NON-AFRICAN AMERICAN: 32
GLUCOSE BLD-MCNC: 98 MG/DL (ref 70–99)
HCT VFR BLD CALC: 51.1 % (ref 40.5–52.5)
HDLC SERPL-MCNC: 38 MG/DL (ref 40–60)
HEMOGLOBIN: 17 G/DL (ref 13.5–17.5)
LDL CHOLESTEROL CALCULATED: 142 MG/DL
LYMPHOCYTES ABSOLUTE: 2.3 K/UL (ref 1–5.1)
LYMPHOCYTES RELATIVE PERCENT: 30.9 %
MCH RBC QN AUTO: 32.3 PG (ref 26–34)
MCHC RBC AUTO-ENTMCNC: 33.2 G/DL (ref 31–36)
MCV RBC AUTO: 97.3 FL (ref 80–100)
MONOCYTES ABSOLUTE: 0.7 K/UL (ref 0–1.3)
MONOCYTES RELATIVE PERCENT: 8.9 %
NEUTROPHILS ABSOLUTE: 4.4 K/UL (ref 1.7–7.7)
NEUTROPHILS RELATIVE PERCENT: 58.6 %
PDW BLD-RTO: 14.4 % (ref 12.4–15.4)
PLATELET # BLD: 230 K/UL (ref 135–450)
PMV BLD AUTO: 8 FL (ref 5–10.5)
POTASSIUM SERPL-SCNC: 4.7 MMOL/L (ref 3.5–5.1)
RBC # BLD: 5.25 M/UL (ref 4.2–5.9)
SODIUM BLD-SCNC: 143 MMOL/L (ref 136–145)
TOTAL PROTEIN: 7.4 G/DL (ref 6.4–8.2)
TRIGL SERPL-MCNC: 257 MG/DL (ref 0–150)
VLDLC SERPL CALC-MCNC: 51 MG/DL
WBC # BLD: 7.5 K/UL (ref 4–11)

## 2021-11-05 DIAGNOSIS — I10 HTN (HYPERTENSION), BENIGN: Primary | ICD-10-CM

## 2021-12-02 ENCOUNTER — APPOINTMENT (OUTPATIENT)
Dept: GENERAL RADIOLOGY | Age: 61
End: 2021-12-02
Payer: COMMERCIAL

## 2021-12-02 ENCOUNTER — HOSPITAL ENCOUNTER (EMERGENCY)
Age: 61
Discharge: HOME | End: 2021-12-02
Attending: EMERGENCY MEDICINE
Payer: COMMERCIAL

## 2021-12-02 ENCOUNTER — APPOINTMENT (OUTPATIENT)
Dept: CT IMAGING | Age: 61
End: 2021-12-02
Payer: COMMERCIAL

## 2021-12-02 VITALS
SYSTOLIC BLOOD PRESSURE: 151 MMHG | TEMPERATURE: 97.7 F | WEIGHT: 215 LBS | BODY MASS INDEX: 29.16 KG/M2 | RESPIRATION RATE: 16 BRPM | OXYGEN SATURATION: 98 % | HEART RATE: 78 BPM | DIASTOLIC BLOOD PRESSURE: 102 MMHG

## 2021-12-02 DIAGNOSIS — R55 VASOVAGAL SYNCOPE: ICD-10-CM

## 2021-12-02 DIAGNOSIS — J18.9 PNEUMONIA OF RIGHT LUNG DUE TO INFECTIOUS ORGANISM, UNSPECIFIED PART OF LUNG: ICD-10-CM

## 2021-12-02 DIAGNOSIS — S13.9XXA NECK SPRAIN, INITIAL ENCOUNTER: Primary | ICD-10-CM

## 2021-12-02 DIAGNOSIS — R91.1 PULMONARY NODULE: ICD-10-CM

## 2021-12-02 LAB
ANION GAP SERPL CALCULATED.3IONS-SCNC: 12 MMOL/L (ref 3–16)
BASOPHILS ABSOLUTE: 0.1 K/UL (ref 0–0.2)
BASOPHILS RELATIVE PERCENT: 0.8 %
BUN BLDV-MCNC: 18 MG/DL (ref 7–20)
CALCIUM SERPL-MCNC: 9 MG/DL (ref 8.3–10.6)
CHLORIDE BLD-SCNC: 103 MMOL/L (ref 99–110)
CO2: 25 MMOL/L (ref 21–32)
CREAT SERPL-MCNC: 1.9 MG/DL (ref 0.9–1.3)
EKG ATRIAL RATE: 65 BPM
EKG DIAGNOSIS: NORMAL
EKG P AXIS: 72 DEGREES
EKG P-R INTERVAL: 170 MS
EKG Q-T INTERVAL: 410 MS
EKG QRS DURATION: 112 MS
EKG QTC CALCULATION (BAZETT): 426 MS
EKG R AXIS: 65 DEGREES
EKG T AXIS: 42 DEGREES
EKG VENTRICULAR RATE: 65 BPM
EOSINOPHILS ABSOLUTE: 0.1 K/UL (ref 0–0.6)
EOSINOPHILS RELATIVE PERCENT: 0.9 %
GFR AFRICAN AMERICAN: 44
GFR NON-AFRICAN AMERICAN: 36
GLUCOSE BLD-MCNC: 174 MG/DL (ref 70–99)
HCT VFR BLD CALC: 45.7 % (ref 40.5–52.5)
HEMOGLOBIN: 15.5 G/DL (ref 13.5–17.5)
LYMPHOCYTES ABSOLUTE: 2.1 K/UL (ref 1–5.1)
LYMPHOCYTES RELATIVE PERCENT: 25.1 %
MCH RBC QN AUTO: 33.1 PG (ref 26–34)
MCHC RBC AUTO-ENTMCNC: 34 G/DL (ref 31–36)
MCV RBC AUTO: 97.4 FL (ref 80–100)
MONOCYTES ABSOLUTE: 0.4 K/UL (ref 0–1.3)
MONOCYTES RELATIVE PERCENT: 4.8 %
NEUTROPHILS ABSOLUTE: 5.7 K/UL (ref 1.7–7.7)
NEUTROPHILS RELATIVE PERCENT: 68.4 %
PDW BLD-RTO: 13.8 % (ref 12.4–15.4)
PLATELET # BLD: 260 K/UL (ref 135–450)
PMV BLD AUTO: 7.4 FL (ref 5–10.5)
POTASSIUM SERPL-SCNC: 3.9 MMOL/L (ref 3.5–5.1)
RBC # BLD: 4.69 M/UL (ref 4.2–5.9)
SODIUM BLD-SCNC: 140 MMOL/L (ref 136–145)
TROPONIN: <0.01 NG/ML
TROPONIN: <0.01 NG/ML
WBC # BLD: 8.3 K/UL (ref 4–11)

## 2021-12-02 PROCEDURE — 6370000000 HC RX 637 (ALT 250 FOR IP): Performed by: NURSE PRACTITIONER

## 2021-12-02 PROCEDURE — 36415 COLL VENOUS BLD VENIPUNCTURE: CPT

## 2021-12-02 PROCEDURE — 80048 BASIC METABOLIC PNL TOTAL CA: CPT

## 2021-12-02 PROCEDURE — 85025 COMPLETE CBC W/AUTO DIFF WBC: CPT

## 2021-12-02 PROCEDURE — 99285 EMERGENCY DEPT VISIT HI MDM: CPT

## 2021-12-02 PROCEDURE — 73590 X-RAY EXAM OF LOWER LEG: CPT

## 2021-12-02 PROCEDURE — 71046 X-RAY EXAM CHEST 2 VIEWS: CPT

## 2021-12-02 PROCEDURE — 71250 CT THORAX DX C-: CPT

## 2021-12-02 PROCEDURE — 93005 ELECTROCARDIOGRAM TRACING: CPT | Performed by: EMERGENCY MEDICINE

## 2021-12-02 PROCEDURE — 6360000002 HC RX W HCPCS: Performed by: NURSE PRACTITIONER

## 2021-12-02 PROCEDURE — 84484 ASSAY OF TROPONIN QUANT: CPT

## 2021-12-02 PROCEDURE — 70450 CT HEAD/BRAIN W/O DYE: CPT

## 2021-12-02 PROCEDURE — 96374 THER/PROPH/DIAG INJ IV PUSH: CPT

## 2021-12-02 RX ORDER — ASPIRIN 81 MG/1
324 TABLET, CHEWABLE ORAL ONCE
Status: COMPLETED | OUTPATIENT
Start: 2021-12-02 | End: 2021-12-02

## 2021-12-02 RX ORDER — AZITHROMYCIN 250 MG/1
250 TABLET, FILM COATED ORAL SEE ADMIN INSTRUCTIONS
Qty: 6 TABLET | Refills: 0 | Status: SHIPPED | OUTPATIENT
Start: 2021-12-02 | End: 2021-12-07

## 2021-12-02 RX ORDER — AMOXICILLIN 500 MG/1
1000 CAPSULE ORAL 3 TIMES DAILY
Qty: 42 CAPSULE | Refills: 0 | Status: SHIPPED | OUTPATIENT
Start: 2021-12-02 | End: 2021-12-09

## 2021-12-02 RX ORDER — LIDOCAINE 50 MG/G
1 PATCH TOPICAL DAILY
Qty: 30 PATCH | Refills: 0 | Status: SHIPPED | OUTPATIENT
Start: 2021-12-02

## 2021-12-02 RX ORDER — BACITRACIN ZINC AND POLYMYXIN B SULFATE 500; 1000 [USP'U]/G; [USP'U]/G
OINTMENT TOPICAL ONCE
Status: COMPLETED | OUTPATIENT
Start: 2021-12-02 | End: 2021-12-02

## 2021-12-02 RX ORDER — LIDOCAINE 4 G/G
1 PATCH TOPICAL ONCE
Status: DISCONTINUED | OUTPATIENT
Start: 2021-12-02 | End: 2021-12-02 | Stop reason: HOSPADM

## 2021-12-02 RX ORDER — KETOROLAC TROMETHAMINE 30 MG/ML
15 INJECTION, SOLUTION INTRAMUSCULAR; INTRAVENOUS ONCE
Status: COMPLETED | OUTPATIENT
Start: 2021-12-02 | End: 2021-12-02

## 2021-12-02 RX ADMIN — BACITRACIN ZINC AND POLYMYXIN B SULFATE: 500; 10000 OINTMENT TOPICAL at 10:50

## 2021-12-02 RX ADMIN — ASPIRIN 324 MG: 81 TABLET, CHEWABLE ORAL at 10:43

## 2021-12-02 RX ADMIN — KETOROLAC TROMETHAMINE 15 MG: 30 INJECTION, SOLUTION INTRAMUSCULAR; INTRAVENOUS at 10:46

## 2021-12-02 ASSESSMENT — ENCOUNTER SYMPTOMS
COUGH: 0
SHORTNESS OF BREATH: 0
GASTROINTESTINAL NEGATIVE: 1

## 2021-12-02 ASSESSMENT — PAIN DESCRIPTION - LOCATION: LOCATION: NECK;SHOULDER

## 2021-12-02 ASSESSMENT — PAIN DESCRIPTION - ORIENTATION: ORIENTATION: LEFT

## 2021-12-02 ASSESSMENT — PAIN SCALES - GENERAL
PAINLEVEL_OUTOF10: 8
PAINLEVEL_OUTOF10: 8

## 2021-12-02 NOTE — ED PROVIDER NOTES
ED Attending Attestation Note     Date of evaluation: 12/2/2021    This patient was seen by the resident. I have seen and examined the patient, agree with the workup, evaluation, management and diagnosis. The care plan has been discussed. I have reviewed the ECG and concur with the resident's interpretation. My assessment reveals adult male with complaint of pain along his trapezius and into the base of his neck which began after he was spent the day looking up to pain to the ceiling. This pain is been ongoing for several days, but this morning he said that he had a bad exacerbation of the same pain, became nauseous, felt like he was going to pass out, and then apparently had a syncopal episode. This does appear to have a significantly long prodrome, not associated with chest pain or shortness of breath (in fact patient denies any chest pain, dyspnea, exertional component to any of the symptoms) and is likely brought on by this episode of what I believe is musculoskeletal pain in the shoulder. Troponin negative x2, no other obvious abnormalities on lab work.      Angélica Sunshine MD  12/02/21 1640

## 2021-12-02 NOTE — ED NOTES
Pt was explained discharge instructions and questions where answered.       Daryl Alvarado RN  12/02/21 1539

## 2021-12-02 NOTE — ED TRIAGE NOTES
Pt was painting yesterday when his L shoulder and L side of neck started to cramp. This morning pt became dizzy with stabbing pain in his neck, fell from chair and hit L leg on table. Denies hitting head.

## 2021-12-02 NOTE — ED PROVIDER NOTES
[facility]  EMERGENCY DEPARTMENT ENCOUNTER          NURSE PRACTITIONER NOTE       Date of evaluation: 12/2/2021    Chief Complaint     Fatigue (L neck pain started yesterday morning after painting that radiates into shoulder )      History of Present Illness     Whit Whitney is a 63 y.o. male with a past medical history of hyperlipidemia, CAD status post balloon angioplasty (NSTEMI 2018); who presents to the emergency department with a complaint of  sudden onset of left-sided neck pain radiating into the left shoulder. Patient states this started yesterday when he was painting, he is right-hand dominant denies painting with his left arm. Patient says the pain is worse with movement of his neck and movement of his left arm. Denies associated numbness or tingling of the extremity. Patient states that this morning he was getting up to get something for the pain because his neck was bothering him when he suddenly became diaphoretic, dizzy and passed out. Patient states that he was sitting at the kitchen chair, believes the chair fell backwards. He hit his left shin against the table, has a small abrasion noted. Does not believe that he hit his head, however his syncope episode was unwitnessed. Denies history of syncope in the past.  Currently denies any lightheadedness/dizziness, vision changes, headaches, chest pain or shortness of breath. Review of Systems     Review of Systems   Constitutional: Positive for diaphoresis. HENT: Negative. Respiratory: Negative for cough and shortness of breath. Cardiovascular: Negative for chest pain, palpitations and leg swelling. Gastrointestinal: Negative. Musculoskeletal: Positive for neck pain (with radiation into left shoulder). Left shin pain   Skin: Negative. Allergic/Immunologic: Negative for immunocompromised state. Neurological: Positive for dizziness and syncope. Negative for tremors, weakness, numbness and headaches. Hematological: Does not bruise/bleed easily. Psychiatric/Behavioral: Negative. Past Medical, Surgical, Family, and Social History     He has no past medical history on file. He has no past surgical history on file. His family history is not on file. He reports that he quit smoking about 2 years ago. His smoking use included cigarettes. He started smoking about 11 years ago. He has a 10.00 pack-year smoking history. He has never used smokeless tobacco. He reports that he does not use drugs. Medications     Discharge Medication List as of 12/2/2021  3:34 PM      CONTINUE these medications which have NOT CHANGED    Details   rosuvastatin (CRESTOR) 10 MG tablet Take 2 tablets by mouth daily, Disp-60 tablet, R-3Normal      losartan-hydroCHLOROthiazide (HYZAAR) 100-12.5 MG per tablet Take 1 tablet by mouth once daily, Disp-30 tablet,R-0Normal      Famotidine (PEPCID PO) Take by mouthHistorical Med      metoprolol succinate (TOPROL XL) 25 MG extended release tablet Take 25 mg by mouth dailyHistorical Med      clopidogrel (PLAVIX) 75 MG tablet Take 75 mg by mouth dailyHistorical Med             Allergies     He has No Known Allergies. Physical Exam     INITIAL VITALS: BP: 127/76, Temp: 97.7 °F (36.5 °C), Pulse: 67, Resp: 15, SpO2: 98 %   Physical Exam  Vitals and nursing note reviewed. Constitutional:       Appearance: Normal appearance. HENT:      Head: Normocephalic and atraumatic. Eyes:      Extraocular Movements: Extraocular movements intact. Pupils: Pupils are equal, round, and reactive to light. Cardiovascular:      Rate and Rhythm: Normal rate and regular rhythm. Pulses: Normal pulses. Heart sounds: Normal heart sounds. Pulmonary:      Effort: Pulmonary effort is normal.      Breath sounds: Normal breath sounds. Musculoskeletal:         General: Normal range of motion. Cervical back: Normal range of motion and neck supple.       Comments: TTP of the left cervical paraspinal musculature and trapezius with full ROM of the left shoulder, full ROM of the c-spine, spasm noted. Intact distal pulses, intact sensation, equal  strength. Skin:     General: Skin is warm and dry. Capillary Refill: Capillary refill takes less than 2 seconds. Neurological:      General: No focal deficit present. Mental Status: He is alert and oriented to person, place, and time. Cranial Nerves: No cranial nerve deficit. Motor: No weakness. Psychiatric:         Mood and Affect: Mood normal.         Behavior: Behavior normal.           Diagnostic Results     EKG   Interpreted in conjunction with emergency department physician No att. providers found  Rhythm: normal sinus   Rate: normal  Axis: normal  Ectopy: none  Conduction: normal  ST Segments: no acute change  T Waves: no acute change  Q Waves: none  Clinical Impression: no acute changes    RADIOLOGY:  CT CHEST WO CONTRAST   Final Result      Small infectious consolidation in the right lower lobe. Abnormality seen on chest x-ray corresponds to a partially calcified nodule which most likely represents a granuloma, but the calcification pattern is not diagnostic and therefore six-month follow-up chest CT is indicated. Subcentimeter pulmonary nodules as detailed above. Solitary enlarged precarinal lymph node may be reactive or granulomatous. CT HEAD WO CONTRAST   Final Result      No acute intracranial hemorrhage or mass effect. XR TIBIA FIBULA LEFT (2 VIEWS)   Final Result   1. No acute findings. XR CHEST (2 VW)   Final Result      Focal opacity in the right upper lobe. Recommend CT chest with intravenous contrast for further assessment.           LABS:   Results for orders placed or performed during the hospital encounter of 12/02/21   CBC auto differential   Result Value Ref Range    WBC 8.3 4.0 - 11.0 K/uL    RBC 4.69 4.20 - 5.90 M/uL    Hemoglobin 15.5 13.5 - 17.5 g/dL    Hematocrit 45.7 40.5 - 52.5 %    MCV 97.4 80.0 - 100.0 fL    MCH 33.1 26.0 - 34.0 pg    MCHC 34.0 31.0 - 36.0 g/dL    RDW 13.8 12.4 - 15.4 %    Platelets 033 758 - 660 K/uL    MPV 7.4 5.0 - 10.5 fL    Neutrophils % 68.4 %    Lymphocytes % 25.1 %    Monocytes % 4.8 %    Eosinophils % 0.9 %    Basophils % 0.8 %    Neutrophils Absolute 5.7 1.7 - 7.7 K/uL    Lymphocytes Absolute 2.1 1.0 - 5.1 K/uL    Monocytes Absolute 0.4 0.0 - 1.3 K/uL    Eosinophils Absolute 0.1 0.0 - 0.6 K/uL    Basophils Absolute 0.1 0.0 - 0.2 K/uL   Basic Metabolic Panel   Result Value Ref Range    Sodium 140 136 - 145 mmol/L    Potassium 3.9 3.5 - 5.1 mmol/L    Chloride 103 99 - 110 mmol/L    CO2 25 21 - 32 mmol/L    Anion Gap 12 3 - 16    Glucose 174 (H) 70 - 99 mg/dL    BUN 18 7 - 20 mg/dL    CREATININE 1.9 (H) 0.9 - 1.3 mg/dL    GFR Non- 36 (A) >60    GFR  44 (A) >60    Calcium 9.0 8.3 - 10.6 mg/dL   Troponin (lab)   Result Value Ref Range    Troponin <0.01 <0.01 ng/mL   Troponin (lab)   Result Value Ref Range    Troponin <0.01 <0.01 ng/mL   EKG 12 Lead   Result Value Ref Range    Ventricular Rate 65 BPM    Atrial Rate 65 BPM    P-R Interval 170 ms    QRS Duration 112 ms    Q-T Interval 410 ms    QTc Calculation (Bazett) 426 ms    P Axis 72 degrees    R Axis 65 degrees    T Axis 42 degrees    Diagnosis       EKG performed in ER and to be interpreted by ER physician. Confirmed by MD, ER ([id number]),  [address], [address] [place] [address] ([id number]) on 12/2/2021 2:05:50 PM         RECENT VITALS:  BP: (!) 151/102, Temp: 97.7 °F (36.5 °C), Pulse: 78, Resp: 16, SpO2: 98 %     ED Course     Nursing Notes, Past Medical Hx, Past Surgical Hx, Social Hx, Allergies, and Family Hx were reviewed.     The patient was given the following medications:  Orders Placed This Encounter   Medications    aspirin chewable tablet 324 mg    bacitracin-polymyxin b (POLYSPORIN) ointment    lidocaine 4 % external patch 1 patch    ketorolac (TORADOL) injection 15 mg    lidocaine (LIDODERM) 5 %     Sig: Place 1 patch onto the skin daily 12 hours on, 12 hours off. Dispense:  30 patch     Refill:  0    amoxicillin (AMOXIL) 500 MG capsule     Sig: Take 2 capsules by mouth 3 times daily for 7 days     Dispense:  42 capsule     Refill:  0    azithromycin (ZITHROMAX) 250 MG tablet     Sig: Take 1 tablet by mouth See Admin Instructions for 5 days 500mg on day 1 followed by 250mg on days 2 - 5     Dispense:  6 tablet     Refill:  0            CONSULTS:  None    MEDICAL DECISION MAKING / ASSESSMENT / Kelly Myles is a 63 y.o. male who presents with complaints as noted in HPI. Patient presents to the emergency department with a complaint of left-sided neck and shoulder pain. Is been ongoing since yesterday, with episode of dizziness/lightheadedness and syncope earlier today. On presentation patient is hemodynamically stable, afebrile, overall very well-appearing. EKG is nonischemic. Pain in the patient's neck and shoulder is reproduced with range of motion of the neck and arm, as well as to palpation of the area leading me to feel this most likely musculoskeletal in nature. However given patient's known cardiac history and syncopal episode concern for possible event. CBC, BMP, troponin was obtained; creatinine 1.9 stable for patient's baseline. Delta troponin unremarkable. Chest x-ray with a possible right sided opacity recommending CT scan. CT without contrast was done given patient's known CKD; this does show multiple pulmonary nodules, as well as concerns for small infectious process. Upon discussion with the patient he does relate several days of a productive cough and some intermittent chills. Given this, patient will be started on amoxicillin and azithromycin for treatment of presumed community-acquired pneumonia. In relation to pulmonary nodule she was instructed to follow-up with his PCP as he will need repeat CT imaging for monitoring.     Patient does have a heart score of 4, and although his pain does seem musculoskeletal nature we did discuss possible ED observation for stress testing. Patient requesting close follow-up with his PCP, does not want to stay for stress testing at this time. He was given strict return precautions which he verbalized understanding of. Patient syncopal episode favored to be vasovagal in nature as he does state that his neck seems to be more painful at that moment when he developed the diaphoresis and dizziness. This has not recurred throughout his stay here, he has remained stable; I do feel stable for discharge home. Patient was given strict return precautions as outlined in the AVS. Patient was agreeable and understanding to this plan of care. This patient was also evaluated by the attending physician. All care plans were discussed and agreed upon. Clinical Impression     1. Neck sprain, initial encounter    2. Vasovagal syncope    3. Pulmonary nodule    4.  Pneumonia of right lung due to infectious organism, unspecified part of lung        Disposition     PATIENT REFERRED TO:  Jeffy Oconnor MD  [address] [phone]      in 1-2 weeks, sooner if needed      DISCHARGE MEDICATIONS:  Discharge Medication List as of 12/2/2021  3:34 PM      START taking these medications    Details   lidocaine (LIDODERM) 5 % Place 1 patch onto the skin daily 12 hours on, 12 hours off., Disp-30 patch, R-0Normal      amoxicillin (AMOXIL) 500 MG capsule Take 2 capsules by mouth 3 times daily for 7 days, Disp-42 capsule, R-0Normal      azithromycin (ZITHROMAX) 250 MG tablet Take 1 tablet by mouth See Admin Instructions for 5 days 500mg on day 1 followed by 250mg on days 2 - 5, Disp-6 tablet, R-0Normal             DISPOSITION Decision To Discharge 12/02/2021 03:11:02 PM        Anastasia Wilcox, DAMIÁN - CNP  12/02/21 [id number]

## 2022-04-28 ENCOUNTER — TELEPHONE (OUTPATIENT)
Dept: CARDIOLOGY CLINIC | Age: 62
End: 2022-04-28

## 2022-04-28 ENCOUNTER — OFFICE VISIT (OUTPATIENT)
Dept: CARDIOLOGY CLINIC | Age: 62
End: 2022-04-28

## 2022-04-28 VITALS
DIASTOLIC BLOOD PRESSURE: 88 MMHG | HEART RATE: 82 BPM | WEIGHT: 216.2 LBS | SYSTOLIC BLOOD PRESSURE: 148 MMHG | BODY MASS INDEX: 29.32 KG/M2

## 2022-04-28 DIAGNOSIS — I10 PRIMARY HYPERTENSION: Primary | ICD-10-CM

## 2022-04-28 DIAGNOSIS — E78.5 HYPERLIPIDEMIA, UNSPECIFIED HYPERLIPIDEMIA TYPE: ICD-10-CM

## 2022-04-28 DIAGNOSIS — I25.10 CORONARY ARTERY DISEASE INVOLVING NATIVE CORONARY ARTERY OF NATIVE HEART WITHOUT ANGINA PECTORIS: ICD-10-CM

## 2022-04-28 PROCEDURE — 99214 OFFICE O/P EST MOD 30 MIN: CPT | Performed by: INTERNAL MEDICINE

## 2022-04-28 PROCEDURE — 93000 ELECTROCARDIOGRAM COMPLETE: CPT | Performed by: INTERNAL MEDICINE

## 2022-04-28 RX ORDER — CLOPIDOGREL BISULFATE 75 MG/1
TABLET ORAL
Qty: 90 TABLET | Refills: 2 | Status: SHIPPED | OUTPATIENT
Start: 2022-04-28 | End: 2022-10-21

## 2022-04-28 RX ORDER — ATORVASTATIN CALCIUM 40 MG/1
40 TABLET, FILM COATED ORAL DAILY
Qty: 30 TABLET | Refills: 5 | Status: SHIPPED | OUTPATIENT
Start: 2022-04-28 | End: 2022-10-21

## 2022-04-28 ASSESSMENT — ENCOUNTER SYMPTOMS
WHEEZING: 0
STRIDOR: 0
CHEST TIGHTNESS: 0
SHORTNESS OF BREATH: 0
CHOKING: 0
GASTROINTESTINAL NEGATIVE: 1
APNEA: 0
EYES NEGATIVE: 1
ALLERGIC/IMMUNOLOGIC NEGATIVE: 1
COUGH: 0

## 2022-04-28 NOTE — TELEPHONE ENCOUNTER
Pt needs refill:    clopidogrel (PLAVIX) 75 MG tablet  Take 1 tablet by mouth once daily, Disp-90 tablet, R-2    Last visit: 4/28/22  Next visit: 11/7/22  Last labs: 11/3/21  Last filled: 1/26/22

## 2022-04-28 NOTE — PROGRESS NOTES
Subjective:      Patient ID: Kenneth Rosas is a 63 y.o. male    CC: s/p PCI     HPI:   Kenneth Rosas is a 66year old male with HX of HLP, HTN and CAD. He presents today for follow up from  (POBA distal LCx). 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 No Sob no orthopnea. Allergies   Allergen Reactions    Eggs Or Egg-Derived Products Other (See Comments)     stomach cramps  Pt says he isn't allergic to anymore        Social History     Socioeconomic History    Marital status: Legally      Spouse name: None    Number of children: None    Years of education: None    Highest education level: None   Occupational History    None   Tobacco Use    Smoking status: Former Smoker     Packs/day: 1.00     Types: Cigarettes    Smokeless tobacco: Never Used    Tobacco comment: started at age 27   Substance and Sexual Activity    Alcohol use: Yes     Comment: weekends     Drug use: Yes     Types: Marijuana Norval Remak)     Comment: occ    Sexual activity: None   Other Topics Concern    None   Social History Narrative    None     Social Determinants of Health     Financial Resource Strain:     Difficulty of Paying Living Expenses: Not on file   Food Insecurity:     Worried About Running Out of Food in the Last Year: Not on file    Suad of Food in the Last Year: Not on file   Transportation Needs:     Lack of Transportation (Medical): Not on file    Lack of Transportation (Non-Medical):  Not on file   Physical Activity:     Days of Exercise per Week: Not on file    Minutes of Exercise per Session: Not on file   Stress:     Feeling of Stress : Not on file   Social Connections:     Frequency of Communication with Friends and Family: Not on file    Frequency of Social Gatherings with Friends and Family: Not on file    Attends Druze Services: Not on file    Active Member of Clubs or Organizations: Not on file    Attends Club or Organization Meetings: Not on file    Marital Status: Not on file   Intimate Partner Violence:     Fear of Current or Ex-Partner: Not on file    Emotionally Abused: Not on file    Physically Abused: Not on file    Sexually Abused: Not on file   Housing Stability:     Unable to Pay for Housing in the Last Year: Not on file    Number of [place] in the Last Year: Not on file    Unstable Housing in the Last Year: Not on file       No family history on file. has a past medical history of CAD (coronary artery disease). Review of Systems   Constitutional: Negative. HENT: Negative. Eyes: Negative. Respiratory: Negative for apnea, cough, choking, chest tightness, shortness of breath, wheezing and stridor. Cardiovascular: Negative for chest pain, palpitations and leg swelling. Gastrointestinal: Negative. Endocrine: Negative. Genitourinary: Negative. Musculoskeletal: Negative. Skin: Negative. Allergic/Immunologic: Negative. Neurological: Negative. Hematological: Negative. Psychiatric/Behavioral: Negative. Vitals:    04/28/22 1438   BP: (!) 148/88   Pulse:            Objective:   Physical Exam  Constitutional:       General: He is not in acute distress. Appearance: He is well-developed. He is not diaphoretic. HENT:      Head: Normocephalic and atraumatic. Eyes:      General:         Right eye: No discharge. Left eye: No discharge. Conjunctiva/sclera: Conjunctivae normal.      Pupils: Pupils are equal, round, and reactive to light. Cardiovascular:      Rate and Rhythm: Normal rate and regular rhythm. Heart sounds: Normal heart sounds. No murmur heard. Pulmonary:      Effort: Pulmonary effort is normal.      Breath sounds: Normal breath sounds. Abdominal:      General: Bowel sounds are normal. There is no distension. Palpations: Abdomen is soft. Musculoskeletal:         General: No deformity. Normal range of motion.       Cervical back: Normal range of motion and neck supple. Skin:     General: Skin is warm and dry. Neurological:      Mental Status: He is alert and oriented to person, place, and time. Current Outpatient Medications   Medication Sig Dispense Refill    clopidogrel (PLAVIX) 75 MG tablet Take 1 tablet by mouth once daily 90 tablet 2    amLODIPine (NORVASC) 5 MG tablet Take 1 tablet by mouth once daily 30 tablet 0    metoprolol succinate (TOPROL XL) 25 MG extended release tablet Take 1 tablet by mouth daily 90 tablet 3    acetaminophen (TYLENOL) 325 MG tablet Take 650 mg by mouth every 6 hours as needed for Pain       No current facility-administered medications for this visit. Vitals:    22 1438   BP: (!) 148/88   Pulse:      [facility] [address] , [place] [address]                             CARDIAC CATHETERIZATION     PATIENT NAME: Yvonne Stafford                     :        1960  MED REC NO:   [id number]                          ROOM:       [facility]  ACCOUNT NO:   [de-identified]                           ADMIT DATE: 2018  PROVIDER:     Amber Baugh. Francisco Javier Maurer MD     DATE OF PROCEDURE:  2018     INDICATION:  This patient presented to the hospital yesterday evening  with chest pain. His troponins trended up from 0.08 to 2.2. His EKG  was nondiagnostic for MI, but he developed ST-segment changes in the  inferior leads. For this reason, he presents for angiography. He has  class III angina. He is on heparin drip.     PROCEDURE IN DETAIL:  Prepped and draped in a sterile manner, locally  anesthetized with 2% lidocaine right, femoral artery area. A 6-French  sheath was placed in retrograde manner, right femoral artery over  guidewire. The 5-French JL-4, JR-4, and pigtail catheters were used  during the diagnostic procedure. All were aspirated and flushed prior  to use.   All catheters were advanced under fluoroscopic guidance over  the guidewire and retracted over the guidewire.     FINDINGS:  The left main coronary artery was normal.  Left anterior  descending coronary artery is normal.  Diagonal branch is normal.   Septal  is normal.  The LAD courses around the apex in the  left ventricle without focal obstruction. True circumflex proximally is  normal.  Obtuse marginal is normal.  The distal circumflex bifurcates  into two very small vessels and they appeared to be subtotally occluded.     JR-4 catheter engages the right coronary artery. That was a normal  dominant right coronary artery. Right posterior descending is normal.   Right posterolateral branch is normal.  There is no collateral supply or  right-to-left or left-to-right collaterals seen.     It was elected to proceed with PCI upon the distal left circumflex. Upfront, I realized that this was going to be a difficult angioplasty  because the vessels involved were very small.     XB 3.0 guide was used. A 190 BMW guidewire was reshaped and reshaped  and eventually it was placed into the distal circumflex. I took a 2.0  balloon, inflated it to 8 atmospheres x30 seconds. I did establish flow  in the more medial branch. I had to use a second wire. A second BMW  guidewire was advanced. I _____ often balloon the other branch artery  and then I put a second BMW guidewire down. I used a 1.5 mm balloon. A  1.5 mm balloon was placed in the more lateral branch and inflations  occurred to 8 atmospheres with repositioning x3. There was TAWANA-3 blood  flow in the lateral branch. There was TAWANA 1 to 2 flow in the more  medial branch. I elected to conclude the procedure at that juncture. The ACT was 200 seconds. At the end, he had received heparin per  protocol. I started an Aggrastat drip at the end.   The reason why I did  not use it upfront was I felt like I was working on such small vessels  that I was concerned about perforation, and so I did not want to _____  upfront.     Interestingly, the patient was pain free at the conclusion of procedure. There was no rash in the body, he had no problems controlling his  saliva; however, he was developing singultus. I gave him Zofran 4 mg IV  push and I did treat him with Benadryl 25 mg in case he has dye allergy. Pigtail catheter in the left ventricle post angioplasty showed an EDP of  about 14 mmHg. The LV pressure was 167/5 and the AO pressure was  163/89. There was no significant gradient.     Please note that the start time of the procedure was 09:30 and the stop  time of the procedure was 10:53 a.m. He was given 4 mg of Versed and  200 mcg of fentanyl by my order in divided doses. He received heparin  during the procedure and an Aggrastat bolus. I did use 300 mL of  contrast during the procedure.     PLAN:  Hydration. Bedrest.  Angio-Seal was successfully deployed in the  right femoral arteriotomy. 600 of Plavix was given, 25 mg of Benadryl  was given and 4 mg Zofran was given. Risk factor modification. Beta  blockers and ARB administration.           Sharon Gilmore MD     D: 12/07/2018 11:35:09       T: 12/07/2018 12:40:29     [id number]  Job#: [id number]     Doc#: [id number]     CC:     Assessment:       Diagnosis Orders   1. Primary hypertension  EKG 12 Lead   2. Coronary artery disease involving native coronary artery of native heart without angina pectoris     3. Hyperlipidemia, unspecified hyperlipidemia type              Plan:      CAD- stable S/p PCTA-  HLP: ldl was high now on lipitor. Check labs  HTN- take one in am amlodipine and one metoprolol  in pm  Check labs. NEED LABS. Continue current medications. Stable cardiovascular status.

## 2022-04-29 DIAGNOSIS — I10 PRIMARY HYPERTENSION: ICD-10-CM

## 2022-04-29 DIAGNOSIS — E78.5 HYPERLIPIDEMIA, UNSPECIFIED HYPERLIPIDEMIA TYPE: ICD-10-CM

## 2022-04-29 DIAGNOSIS — I25.10 CORONARY ARTERY DISEASE INVOLVING NATIVE CORONARY ARTERY OF NATIVE HEART WITHOUT ANGINA PECTORIS: ICD-10-CM

## 2022-04-29 LAB
A/G RATIO: 1.7 (ref 1.1–2.2)
ALBUMIN SERPL-MCNC: 4.4 G/DL (ref 3.4–5)
ALP BLD-CCNC: 81 U/L (ref 40–129)
ALT SERPL-CCNC: 15 U/L (ref 10–40)
ANION GAP SERPL CALCULATED.3IONS-SCNC: 17 MMOL/L (ref 3–16)
AST SERPL-CCNC: 22 U/L (ref 15–37)
BASOPHILS ABSOLUTE: 0.1 K/UL (ref 0–0.2)
BASOPHILS RELATIVE PERCENT: 0.9 %
BILIRUB SERPL-MCNC: <0.2 MG/DL (ref 0–1)
BUN BLDV-MCNC: 24 MG/DL (ref 7–20)
CALCIUM SERPL-MCNC: 9.6 MG/DL (ref 8.3–10.6)
CHLORIDE BLD-SCNC: 100 MMOL/L (ref 99–110)
CHOLESTEROL, TOTAL: 198 MG/DL (ref 0–199)
CO2: 21 MMOL/L (ref 21–32)
CREAT SERPL-MCNC: 2.1 MG/DL (ref 0.8–1.3)
EOSINOPHILS ABSOLUTE: 0.1 K/UL (ref 0–0.6)
EOSINOPHILS RELATIVE PERCENT: 1.4 %
GFR AFRICAN AMERICAN: 39
GFR NON-AFRICAN AMERICAN: 32
GLUCOSE BLD-MCNC: 97 MG/DL (ref 70–99)
HCT VFR BLD CALC: 47.9 % (ref 40.5–52.5)
HDLC SERPL-MCNC: 32 MG/DL (ref 40–60)
HEMOGLOBIN: 16.5 G/DL (ref 13.5–17.5)
LDL CHOLESTEROL CALCULATED: ABNORMAL MG/DL
LDL CHOLESTEROL DIRECT: 93 MG/DL
LYMPHOCYTES ABSOLUTE: 2.5 K/UL (ref 1–5.1)
LYMPHOCYTES RELATIVE PERCENT: 31.2 %
MCH RBC QN AUTO: 33.3 PG (ref 26–34)
MCHC RBC AUTO-ENTMCNC: 34.4 G/DL (ref 31–36)
MCV RBC AUTO: 96.8 FL (ref 80–100)
MONOCYTES ABSOLUTE: 0.7 K/UL (ref 0–1.3)
MONOCYTES RELATIVE PERCENT: 8.5 %
NEUTROPHILS ABSOLUTE: 4.6 K/UL (ref 1.7–7.7)
NEUTROPHILS RELATIVE PERCENT: 58 %
PDW BLD-RTO: 14 % (ref 12.4–15.4)
PLATELET # BLD: 265 K/UL (ref 135–450)
PMV BLD AUTO: 7.4 FL (ref 5–10.5)
POTASSIUM SERPL-SCNC: 4.4 MMOL/L (ref 3.5–5.1)
RBC # BLD: 4.95 M/UL (ref 4.2–5.9)
SODIUM BLD-SCNC: 138 MMOL/L (ref 136–145)
TOTAL PROTEIN: 7 G/DL (ref 6.4–8.2)
TRIGL SERPL-MCNC: 332 MG/DL (ref 0–150)
VLDLC SERPL CALC-MCNC: ABNORMAL MG/DL
WBC # BLD: 7.9 K/UL (ref 4–11)

## 2022-06-30 ENCOUNTER — TELEPHONE (OUTPATIENT)
Dept: CASE MANAGEMENT | Age: 62
End: 2022-06-30

## 2022-06-30 NOTE — TELEPHONE ENCOUNTER
Pt due for lung nodule f/u CT as indicated on CT Chest on 12/2/21. Reminder letter mailed.      Francesca ANDERSONN, RN   Lung Nodule Navigator  [facility]  [phone]

## 2022-07-25 ENCOUNTER — TELEPHONE (OUTPATIENT)
Dept: CASE MANAGEMENT | Age: 62
End: 2022-07-25

## 2022-08-10 RX ORDER — METOPROLOL SUCCINATE 25 MG/1
TABLET, EXTENDED RELEASE ORAL
Qty: 30 TABLET | Refills: 0 | Status: SHIPPED | OUTPATIENT
Start: 2022-08-10 | End: 2022-09-12

## 2022-09-12 RX ORDER — METOPROLOL SUCCINATE 25 MG/1
TABLET, EXTENDED RELEASE ORAL
Qty: 30 TABLET | Refills: 0 | Status: SHIPPED | OUTPATIENT
Start: 2022-09-12 | End: 2022-10-17

## 2022-10-17 RX ORDER — METOPROLOL SUCCINATE 25 MG/1
TABLET, EXTENDED RELEASE ORAL
Qty: 90 TABLET | Refills: 1 | Status: SHIPPED | OUTPATIENT
Start: 2022-10-17

## 2022-10-21 RX ORDER — ATORVASTATIN CALCIUM 40 MG/1
TABLET, FILM COATED ORAL
Qty: 90 TABLET | Refills: 3 | Status: SHIPPED | OUTPATIENT
Start: 2022-10-21

## 2022-10-21 RX ORDER — CLOPIDOGREL BISULFATE 75 MG/1
TABLET ORAL
Qty: 90 TABLET | Refills: 3 | Status: SHIPPED | OUTPATIENT
Start: 2022-10-21

## 2022-10-21 NOTE — TELEPHONE ENCOUNTER
Requested Prescriptions     Pending Prescriptions Disp Refills    atorvastatin (LIPITOR) 40 MG tablet [Pharmacy Med Name: Atorvastatin Calcium 40 MG Oral Tablet] 90 tablet 3     Sig: Take 1 tablet by mouth once daily    clopidogrel (PLAVIX) 75 MG tablet [Pharmacy Med Name: Clopidogrel Bisulfate 75 MG Oral Tablet] 90 tablet 3     Sig: Take 1 tablet by mouth once daily            Last Office Visit: 4/28/2022     Next Office Visit: 11/7/2022       Last Labs: [phone]

## 2022-11-07 ENCOUNTER — OFFICE VISIT (OUTPATIENT)
Dept: CARDIOLOGY CLINIC | Age: 62
End: 2022-11-07
Payer: COMMERCIAL

## 2022-11-07 VITALS
BODY MASS INDEX: 29.97 KG/M2 | SYSTOLIC BLOOD PRESSURE: 130 MMHG | DIASTOLIC BLOOD PRESSURE: 80 MMHG | WEIGHT: 221 LBS | HEART RATE: 78 BPM

## 2022-11-07 DIAGNOSIS — E78.5 HYPERLIPIDEMIA, UNSPECIFIED HYPERLIPIDEMIA TYPE: ICD-10-CM

## 2022-11-07 DIAGNOSIS — I10 HTN (HYPERTENSION), BENIGN: Primary | ICD-10-CM

## 2022-11-07 DIAGNOSIS — I25.10 CORONARY ARTERY DISEASE INVOLVING NATIVE CORONARY ARTERY OF NATIVE HEART WITHOUT ANGINA PECTORIS: ICD-10-CM

## 2022-11-07 PROCEDURE — 3074F SYST BP LT 130 MM HG: CPT | Performed by: INTERNAL MEDICINE

## 2022-11-07 PROCEDURE — 99214 OFFICE O/P EST MOD 30 MIN: CPT | Performed by: INTERNAL MEDICINE

## 2022-11-07 PROCEDURE — 3078F DIAST BP <80 MM HG: CPT | Performed by: INTERNAL MEDICINE

## 2022-11-07 ASSESSMENT — ENCOUNTER SYMPTOMS
STRIDOR: 0
WHEEZING: 0
CHOKING: 0
GASTROINTESTINAL NEGATIVE: 1
CHEST TIGHTNESS: 0
COUGH: 0
APNEA: 0
SHORTNESS OF BREATH: 0
ALLERGIC/IMMUNOLOGIC NEGATIVE: 1
EYES NEGATIVE: 1

## 2022-11-07 NOTE — PROGRESS NOTES
Subjective:      Patient ID: Mariposa Juan is a 63 y.o. male    CC: s/p PCI     HPI:   Mariposa Juan is a 63year old male with HX of HLP, HTN and CAD. He presents today for follow up from  (POBA distal LCx). 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 No Sob no orthopnea. Allergies   Allergen Reactions    Eggs Or Egg-Derived Products Other (See Comments)     stomach cramps  Pt says he isn't allergic to anymore        Social History     Socioeconomic History    Marital status: Legally      Spouse name: None    Number of children: None    Years of education: None    Highest education level: None   Tobacco Use    Smoking status: Former     Packs/day: 1.00     Types: Cigarettes    Smokeless tobacco: Never    Tobacco comments:     started at age 27   Substance and Sexual Activity    Alcohol use: Yes     Comment: weekends     Drug use: Yes     Types: Marijuana Shellye Burkitt)     Comment: occ       No family history on file. has a past medical history of CAD (coronary artery disease). Review of Systems   Constitutional: Negative. HENT: Negative. Eyes: Negative. Respiratory:  Negative for apnea, cough, choking, chest tightness, shortness of breath, wheezing and stridor. Cardiovascular:  Negative for chest pain, palpitations and leg swelling. Gastrointestinal: Negative. Endocrine: Negative. Genitourinary: Negative. Musculoskeletal: Negative. Skin: Negative. Allergic/Immunologic: Negative. Neurological: Negative. Hematological: Negative. Psychiatric/Behavioral: Negative. Vitals:    11/07/22 1425   BP: 130/80   Pulse: 78           Objective:   Physical Exam  Constitutional:       General: He is not in acute distress. Appearance: He is well-developed. He is not diaphoretic. HENT:      Head: Normocephalic and atraumatic. Eyes:      General:         Right eye: No discharge. Left eye: No discharge. Conjunctiva/sclera: Conjunctivae normal.      Pupils: Pupils are equal, round, and reactive to light. Cardiovascular:      Rate and Rhythm: Normal rate and regular rhythm. Heart sounds: Normal heart sounds. No murmur heard. Pulmonary:      Effort: Pulmonary effort is normal.      Breath sounds: Normal breath sounds. Abdominal:      General: Bowel sounds are normal. There is no distension. Palpations: Abdomen is soft. Musculoskeletal:         General: No deformity. Normal range of motion. Cervical back: Normal range of motion and neck supple. Skin:     General: Skin is warm and dry. Neurological:      Mental Status: He is alert and oriented to person, place, and time. Current Outpatient Medications   Medication Sig Dispense Refill    atorvastatin (LIPITOR) 40 MG tablet Take 1 tablet by mouth once daily 90 tablet 3    clopidogrel (PLAVIX) 75 MG tablet Take 1 tablet by mouth once daily 90 tablet 3    metoprolol succinate (TOPROL XL) 25 MG extended release tablet Take 1 tablet by mouth once daily 90 tablet 1    rosuvastatin (CRESTOR) 10 MG tablet Take 1 tablet by mouth once daily 90 tablet 0    amLODIPine (NORVASC) 5 MG tablet Take 1 tablet by mouth once daily 30 tablet 0    acetaminophen (TYLENOL) 325 MG tablet Take 650 mg by mouth every 6 hours as needed for Pain       No current facility-administered medications for this visit. Vitals:    22 1425   BP: 130/80   Pulse: 78     [facility] [address] [address]                             CARDIAC CATHETERIZATION     PATIENT NAME: Kennedy Antoine                     :        1960  MED REC NO:   [id number]                          ROOM:       4503  ACCOUNT NO:   [de-identified]                           ADMIT DATE: 2018  PROVIDER:     Cain Love.  Dmitry Molina MD     DATE OF PROCEDURE:  2018     INDICATION:  This patient presented to the hospital yesterday evening  with chest pain. His troponins trended up from 0.08 to 2.2. His EKG  was nondiagnostic for MI, but he developed ST-segment changes in the  inferior leads. For this reason, he presents for angiography. He has  class III angina. He is on heparin drip. PROCEDURE IN DETAIL:  Prepped and draped in a sterile manner, locally  anesthetized with 2% lidocaine right, femoral artery area. A 6-Central African  sheath was placed in retrograde manner, right femoral artery over  guidewire. The 5-[place] JL-4, JR-4, and pigtail catheters were used  during the diagnostic procedure. All were aspirated and flushed prior  to use. All catheters were advanced under fluoroscopic guidance over  the guidewire and retracted over the guidewire. FINDINGS:  The left main coronary artery was normal.  Left anterior  descending coronary artery is normal.  Diagonal branch is normal.   Septal  is normal.  The LAD courses around the apex in the  left ventricle without focal obstruction. True circumflex proximally is  normal.  Obtuse marginal is normal.  The distal circumflex bifurcates  into two very small vessels and they appeared to be subtotally occluded. JR-4 catheter engages the right coronary artery. That was a normal  dominant right coronary artery. Right posterior descending is normal.   Right posterolateral branch is normal.  There is no collateral supply or  right-to-left or left-to-right collaterals seen. It was elected to proceed with PCI upon the distal left circumflex. Upfront, I realized that this was going to be a difficult angioplasty  because the vessels involved were very small. XB 3.0 guide was used. A 190 BMW guidewire was reshaped and reshaped  and eventually it was placed into the distal circumflex. I took a 2.0  balloon, inflated it to 8 atmospheres x30 seconds. I did establish flow  in the more medial branch. I had to use a second wire. A second BMW  guidewire was advanced.   I _____ often balloon the other branch artery  and then I put a second BMW guidewire down. I used a 1.5 mm balloon. A  1.5 mm balloon was placed in the more lateral branch and inflations  occurred to 8 atmospheres with repositioning x3. There was TAWANA-3 blood  flow in the lateral branch. There was TAWANA 1 to 2 flow in the more  medial branch. I elected to conclude the procedure at that juncture. The ACT was 200 seconds. At the end, he had received heparin per  protocol. I started an Aggrastat drip at the end. The reason why I did  not use it upfront was I felt like I was working on such small vessels  that I was concerned about perforation, and so I did not want to _____  upfront. Interestingly, the patient was pain free at the conclusion of procedure. There was no rash in the body, he had no problems controlling his  saliva; however, he was developing singultus. I gave him Zofran 4 mg IV  push and I did treat him with Benadryl 25 mg in case he has dye allergy. Pigtail catheter in the left ventricle post angioplasty showed an EDP of  about 14 mmHg. The LV pressure was 167/5 and the AO pressure was  163/89. There was no significant gradient. Please note that the start time of the procedure was 09:30 and the stop  time of the procedure was 10:53 a.m. He was given 4 mg of Versed and  200 mcg of fentanyl by my order in divided doses. He received heparin  during the procedure and an Aggrastat bolus. I did use 300 mL of  contrast during the procedure. PLAN:  Hydration. Bedrest.  Angio-Seal was successfully deployed in the  right femoral arteriotomy. 600 of Plavix was given, 25 mg of Benadryl  was given and 4 mg Zofran was given. Risk factor modification. Beta  blockers and ARB administration. Chas Bailey MD     D: 12/07/2018 11:35:09       T: 12/07/2018 12:40:29     [email or web address]  Job#: [id number]     Doc#: [id number]     CC:     Assessment:       Diagnosis Orders   1.  HTN (hypertension), benign        2. Hyperlipidemia, unspecified hyperlipidemia type        3. Coronary artery disease involving native coronary artery of native heart without angina pectoris               Plan:      CAD- stable S/p PCTA-  HLP: ldl was high now on lipitor. Check labs  HTN- take one in am amlodipine and one metoprolol  in pm    Continue current medications. Stable cardiovascular status.

## 2022-11-16 ENCOUNTER — TELEPHONE (OUTPATIENT)
Dept: CARDIOLOGY CLINIC | Age: 62
End: 2022-11-16

## 2022-11-16 RX ORDER — ROSUVASTATIN CALCIUM 10 MG/1
TABLET, COATED ORAL
Qty: 90 TABLET | Refills: 3 | Status: SHIPPED | OUTPATIENT
Start: 2022-11-16

## 2022-11-16 NOTE — TELEPHONE ENCOUNTER
Patient called and stated he ran out of his rosuvastatin. Patient inquiring to get a refill of:    rosuvastatin (CRESTOR) 10 MG tablet [[id number]]     Order Details  Dose, Route, Frequency: As Directed   Dispense Quantity: 90 tablet Refills: 0          Sig: Take 1 tablet by mouth once daily     Patient wants prescription sent to [id number]    [phone] Office  [phone] Fax    Patient wants a call back when the prescription has been sent.

## 2022-12-02 RX ORDER — AMLODIPINE BESYLATE 5 MG/1
TABLET ORAL
Qty: 90 TABLET | Refills: 0 | Status: SHIPPED | OUTPATIENT
Start: 2022-12-02

## 2023-03-04 ENCOUNTER — TELEPHONE (OUTPATIENT)
Dept: CARDIOLOGY CLINIC | Age: 63
End: 2023-03-04

## 2023-03-06 RX ORDER — AMLODIPINE BESYLATE 5 MG/1
TABLET ORAL
Qty: 90 TABLET | Refills: 3 | Status: SHIPPED | OUTPATIENT
Start: 2023-03-06

## 2023-03-06 NOTE — TELEPHONE ENCOUNTER
Requested Prescriptions     Pending Prescriptions Disp Refills    amLODIPine (NORVASC) 5 MG tablet [Pharmacy Med Name: amLODIPine Besylate 5 MG Oral Tablet] 90 tablet 0     Sig: Take 1 tablet by mouth once daily          Last Office Visit: 11/7/2022     Next Office Visit: 5/8/2023

## 2023-04-18 NOTE — TELEPHONE ENCOUNTER
Requested Prescriptions     Pending Prescriptions Disp Refills    metoprolol succinate (TOPROL XL) 25 MG extended release tablet [Pharmacy Med Name: Metoprolol Succinate ER 25 MG Oral Tablet Extended Release 24 Hour] 90 tablet 3     Sig: Take 1 tablet by mouth once daily          Last Office Visit: 11/7/2022     Next Office Visit: 5/8/2023         Last Labs: [phone]

## 2023-04-18 NOTE — TELEPHONE ENCOUNTER
Requested Prescriptions     Pending Prescriptions Disp Refills    metoprolol succinate (TOPROL XL) 25 MG extended release tablet [Pharmacy Med Name: Metoprolol Succinate ER 25 MG Oral Tablet Extended Release 24 Hour] 90 tablet 3     Sig: Take 1 tablet by mouth once daily          Last Office Visit: 11/7/2022     Next Office Visit: 5/8/2023         Last Labs: [phone]

## 2023-04-19 RX ORDER — METOPROLOL SUCCINATE 25 MG/1
TABLET, EXTENDED RELEASE ORAL
Qty: 90 TABLET | Refills: 3 | Status: SHIPPED | OUTPATIENT
Start: 2023-04-19

## 2023-05-08 ENCOUNTER — OFFICE VISIT (OUTPATIENT)
Dept: CARDIOLOGY CLINIC | Age: 63
End: 2023-05-08
Payer: COMMERCIAL

## 2023-05-08 VITALS
SYSTOLIC BLOOD PRESSURE: 138 MMHG | WEIGHT: 222 LBS | HEART RATE: 77 BPM | BODY MASS INDEX: 30.11 KG/M2 | DIASTOLIC BLOOD PRESSURE: 82 MMHG

## 2023-05-08 DIAGNOSIS — I25.10 CORONARY ARTERY DISEASE INVOLVING NATIVE CORONARY ARTERY OF NATIVE HEART WITHOUT ANGINA PECTORIS: ICD-10-CM

## 2023-05-08 DIAGNOSIS — E78.5 HYPERLIPIDEMIA, UNSPECIFIED HYPERLIPIDEMIA TYPE: ICD-10-CM

## 2023-05-08 DIAGNOSIS — I10 HTN (HYPERTENSION), BENIGN: Primary | ICD-10-CM

## 2023-05-08 PROCEDURE — 99214 OFFICE O/P EST MOD 30 MIN: CPT | Performed by: INTERNAL MEDICINE

## 2023-05-08 PROCEDURE — 3075F SYST BP GE 130 - 139MM HG: CPT | Performed by: INTERNAL MEDICINE

## 2023-05-08 PROCEDURE — 93000 ELECTROCARDIOGRAM COMPLETE: CPT | Performed by: INTERNAL MEDICINE

## 2023-05-08 PROCEDURE — 3079F DIAST BP 80-89 MM HG: CPT | Performed by: INTERNAL MEDICINE

## 2023-05-08 RX ORDER — ROSUVASTATIN CALCIUM 20 MG/1
20 TABLET, COATED ORAL DAILY
Qty: 30 TABLET | Refills: 3 | Status: SHIPPED | OUTPATIENT
Start: 2023-05-08

## 2023-05-08 ASSESSMENT — ENCOUNTER SYMPTOMS
ALLERGIC/IMMUNOLOGIC NEGATIVE: 1
APNEA: 0
SHORTNESS OF BREATH: 0
STRIDOR: 0
COUGH: 0
GASTROINTESTINAL NEGATIVE: 1
EYES NEGATIVE: 1
CHEST TIGHTNESS: 0
WHEEZING: 0
CHOKING: 0

## 2023-05-08 NOTE — PROGRESS NOTES
Subjective:      Patient ID: Sancho Goodman is a 66 y.o. male    CC: s/p PCI     HPI:   Sancho Goodman is a 63year old male with HX of HLP, HTN and CAD. He presents today for follow up from  (POBA distal LCx). 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 No Sob no orthopnea. Allergies   Allergen Reactions    Eggs Or Egg-Derived Products Other (See Comments)     stomach cramps  Pt says he isn't allergic to anymore        Social History     Socioeconomic History    Marital status: Legally      Spouse name: None    Number of children: None    Years of education: None    Highest education level: None   Tobacco Use    Smoking status: Former     Packs/day: 1.00     Types: Cigarettes    Smokeless tobacco: Never    Tobacco comments:     started at age 27   Substance and Sexual Activity    Alcohol use: Yes     Comment: weekends     Drug use: Yes     Types: Marijuana Frances Agapito)     Comment: occ       No family history on file. has a past medical history of CAD (coronary artery disease). Review of Systems   Constitutional: Negative. HENT: Negative. Eyes: Negative. Respiratory:  Negative for apnea, cough, choking, chest tightness, shortness of breath, wheezing and stridor. Cardiovascular:  Negative for chest pain, palpitations and leg swelling. Gastrointestinal: Negative. Endocrine: Negative. Genitourinary: Negative. Musculoskeletal: Negative. Skin: Negative. Allergic/Immunologic: Negative. Neurological: Negative. Hematological: Negative. Psychiatric/Behavioral: Negative. Vitals:    05/08/23 1457   BP: 138/82   Pulse: 77           Objective:   Physical Exam  Constitutional:       General: He is not in acute distress. Appearance: He is well-developed. He is not diaphoretic. HENT:      Head: Normocephalic and atraumatic. Eyes:      General:         Right eye: No discharge. Left eye: No discharge.

## 2023-09-10 DIAGNOSIS — I25.10 CORONARY ARTERY DISEASE INVOLVING NATIVE CORONARY ARTERY OF NATIVE HEART WITHOUT ANGINA PECTORIS: ICD-10-CM

## 2023-09-10 DIAGNOSIS — E78.5 HYPERLIPIDEMIA, UNSPECIFIED HYPERLIPIDEMIA TYPE: ICD-10-CM

## 2023-09-10 DIAGNOSIS — I10 HTN (HYPERTENSION), BENIGN: ICD-10-CM

## 2023-09-11 RX ORDER — ROSUVASTATIN CALCIUM 20 MG/1
20 TABLET, COATED ORAL DAILY
Qty: 90 TABLET | Refills: 3 | Status: SHIPPED | OUTPATIENT
Start: 2023-09-11

## 2023-09-11 NOTE — TELEPHONE ENCOUNTER
Requested Prescriptions     Pending Prescriptions Disp Refills    rosuvastatin (CRESTOR) 20 MG tablet [Pharmacy Med Name: Rosuvastatin Calcium 20 MG Oral Tablet] 90 tablet 3     Sig: Take 1 tablet by mouth once daily            Last Office Visit: 5/8/2023     Next Office Visit: 11/13/2023     Last labs :04.29.2022 lipid panel

## 2023-10-23 NOTE — TELEPHONE ENCOUNTER
Requested Prescriptions     Pending Prescriptions Disp Refills    clopidogrel (PLAVIX) 75 MG tablet [Pharmacy Med Name: Clopidogrel Bisulfate 75 MG Oral Tablet] 90 tablet 3     Sig: Take 1 tablet by mouth once daily            Last Office Visit: 5/8/2023     Next Office Visit: 11/13/2023

## 2023-10-24 RX ORDER — CLOPIDOGREL BISULFATE 75 MG/1
TABLET ORAL
Qty: 90 TABLET | Refills: 3 | Status: SHIPPED | OUTPATIENT
Start: 2023-10-24

## 2024-03-04 RX ORDER — AMLODIPINE BESYLATE 5 MG/1
TABLET ORAL
Qty: 90 TABLET | Refills: 0 | Status: SHIPPED | OUTPATIENT
Start: 2024-03-04

## 2024-03-08 ENCOUNTER — HOSPITAL ENCOUNTER (EMERGENCY)
Age: 64
Discharge: HOME | End: 2024-03-08
Attending: EMERGENCY MEDICINE
Payer: COMMERCIAL

## 2024-03-08 ENCOUNTER — APPOINTMENT (OUTPATIENT)
Dept: GENERAL RADIOLOGY | Age: 64
End: 2024-03-08
Payer: COMMERCIAL

## 2024-03-08 VITALS
RESPIRATION RATE: 20 BRPM | TEMPERATURE: 98 F | HEIGHT: 72 IN | DIASTOLIC BLOOD PRESSURE: 100 MMHG | OXYGEN SATURATION: 97 % | BODY MASS INDEX: 30.11 KG/M2 | SYSTOLIC BLOOD PRESSURE: 152 MMHG | HEART RATE: 72 BPM

## 2024-03-08 DIAGNOSIS — M25.571 ACUTE RIGHT ANKLE PAIN: Primary | ICD-10-CM

## 2024-03-08 LAB
ANION GAP SERPL CALCULATED.3IONS-SCNC: 12 MMOL/L (ref 3–16)
BASOPHILS # BLD: 0 K/UL (ref 0–0.2)
BASOPHILS NFR BLD: 0.4 %
BUN SERPL-MCNC: 27 MG/DL (ref 7–20)
CALCIUM SERPL-MCNC: 9.2 MG/DL (ref 8.3–10.6)
CHLORIDE SERPL-SCNC: 107 MMOL/L (ref 99–110)
CO2 SERPL-SCNC: 22 MMOL/L (ref 21–32)
CREAT SERPL-MCNC: 2.5 MG/DL (ref 0.8–1.3)
CRP SERPL-MCNC: 6 MG/L (ref 0–5.1)
DEPRECATED RDW RBC AUTO: 14 % (ref 12.4–15.4)
EOSINOPHIL # BLD: 0.1 K/UL (ref 0–0.6)
EOSINOPHIL NFR BLD: 1.1 %
ERYTHROCYTE [SEDIMENTATION RATE] IN BLOOD BY WESTERGREN METHOD: 23 MM/HR (ref 0–20)
GFR SERPLBLD CREATININE-BSD FMLA CKD-EPI: 28 ML/MIN/{1.73_M2}
GLUCOSE SERPL-MCNC: 119 MG/DL (ref 70–99)
HCT VFR BLD AUTO: 45.6 % (ref 40.5–52.5)
HGB BLD-MCNC: 15.5 G/DL (ref 13.5–17.5)
LYMPHOCYTES # BLD: 2.2 K/UL (ref 1–5.1)
LYMPHOCYTES NFR BLD: 30.4 %
MCH RBC QN AUTO: 33.1 PG (ref 26–34)
MCHC RBC AUTO-ENTMCNC: 33.9 G/DL (ref 31–36)
MCV RBC AUTO: 97.5 FL (ref 80–100)
MONOCYTES # BLD: 0.6 K/UL (ref 0–1.3)
MONOCYTES NFR BLD: 8 %
NEUTROPHILS # BLD: 4.3 K/UL (ref 1.7–7.7)
NEUTROPHILS NFR BLD: 60.1 %
PLATELET # BLD AUTO: 246 K/UL (ref 135–450)
PMV BLD AUTO: 7.2 FL (ref 5–10.5)
POTASSIUM SERPL-SCNC: 4.5 MMOL/L (ref 3.5–5.1)
RBC # BLD AUTO: 4.67 M/UL (ref 4.2–5.9)
SODIUM SERPL-SCNC: 141 MMOL/L (ref 136–145)
WBC # BLD AUTO: 7.2 K/UL (ref 4–11)

## 2024-03-08 PROCEDURE — 86140 C-REACTIVE PROTEIN: CPT

## 2024-03-08 PROCEDURE — 85025 COMPLETE CBC W/AUTO DIFF WBC: CPT

## 2024-03-08 PROCEDURE — 73610 X-RAY EXAM OF ANKLE: CPT

## 2024-03-08 PROCEDURE — 80048 BASIC METABOLIC PNL TOTAL CA: CPT

## 2024-03-08 PROCEDURE — 85652 RBC SED RATE AUTOMATED: CPT

## 2024-03-08 PROCEDURE — 99284 EMERGENCY DEPT VISIT MOD MDM: CPT

## 2024-03-08 RX ORDER — TRAMADOL HYDROCHLORIDE 50 MG/1
50 TABLET ORAL EVERY 6 HOURS PRN
Qty: 12 TABLET | Refills: 0 | Status: SHIPPED | OUTPATIENT
Start: 2024-03-08 | End: 2024-03-11

## 2024-03-08 ASSESSMENT — PAIN DESCRIPTION - LOCATION: LOCATION: ANKLE

## 2024-03-08 ASSESSMENT — PAIN SCALES - GENERAL: PAINLEVEL_OUTOF10: 5

## 2024-03-08 ASSESSMENT — ENCOUNTER SYMPTOMS: COLOR CHANGE: 0

## 2024-03-08 ASSESSMENT — PAIN DESCRIPTION - ORIENTATION: ORIENTATION: RIGHT

## 2024-03-08 ASSESSMENT — PAIN - FUNCTIONAL ASSESSMENT: PAIN_FUNCTIONAL_ASSESSMENT: 0-10

## 2024-03-08 ASSESSMENT — PAIN DESCRIPTION - DESCRIPTORS: DESCRIPTORS: SHOOTING

## 2024-03-08 NOTE — ED PROVIDER NOTES
THE [facility]  EMERGENCY DEPARTMENT ENCOUNTER          PHYSICIAN ASSISTANT NOTE       Date of evaluation: 3/8/2024    Chief Complaint     Ankle Pain (Patient comes to the ED today for ankle pain x3 days. Patient was at work when this came on. Patient denies any injury or strenuous activities. )    History of Present Illness     Ludwin Rush is a 64 y.o. male with past medical history of chronic kidney disease, coronary artery disease, hypertension presenting to the emergency department for evaluation of atraumatic right ankle pain.  Symptoms started about 3 days ago and has been persistent since onset.  He cannot identify any aggravating features.  Now the pain is making it difficult for him to bear weight on his ankle.  He denies any associated fevers or chills, no known redness, but does report some swelling.  Pain is across the front of his ankle.  No pain or swelling extending into his lower leg or calf.  He denies any history of gout.     ASSESSMENT / PLAN  (MEDICAL DECISION MAKING)     INITIAL VITALS: BP: (!) 152/100, Temp: 98 °F (36.7 °C), Pulse: 72, Respirations: 20, SpO2: 97 %    Ludwin Rush is a 64 y.o. male presenting to the emergency for evaluation of atraumatic right ankle pain.  Symptoms have been present over the last 3 days.  He denies any associated trauma or injury.  He is neurovascularly intact distal to his pain in his foot and toes.  Denies any symptoms of systemic illness.  Upon presentation vitals were stable and he was afebrile.    He has a history of chronic kidney disease, no known history of diabetes or gout.  He has intact distal pulses.  Tenderness is mostly overlying the anterior portion of the right ankle with mild swelling of the right compared to the left.  There is warmth with no overlying erythema.  Dorsiflexion and plantarflexion are intact, very slightly limited with full range of motion secondary to pain and swelling.  He states that he has had difficulty bearing

## 2024-03-08 NOTE — DISCHARGE INSTRUCTIONS
Take Tylenol up to 1 g every 6-8 hours for pain control.  You have been given a short course of tramadol to take for breakthrough severe pain.  This is an opiate medication which has addictive potential and sedative side effects.    Rest, ice, and elevate the foot as much as possible for the next 48 hours, to minimize swelling.  You should wear the Ace wrap for support and comfort.  Ensure that the wrap is snug, but not so tight that it causes any numbness, tingling, or color changes in your toes.  Please call your doctor's office, or return to the emergency department, if you have increasing pain, increasing swelling, or other concerning symptoms.    Rest:  Rest and protect the injured area. If it hurts to bear weight on the injury, use crutches, if it hurts to move the area immobilize it with a splint.     Ice:  Apply ice or a frozen object, such as a bag of corn, etc from the freezer, to the injury. The cold will reduce swelling and pain at the injured site. This step should be done as soon as possible. Apply the frozen object to the area for 20 minutes 5-6 times a day for the first 48 hours. Do not use heat in the first 48 hours after an injury    Compression:  Compress the injured site by applying an Ace bandage. This will decreases swelling of the injured region. Although the wrap should be snug, make sure it is not too tight as this can cause numbness, tingling, or increased pain.     Elevation:  Elevate the foot/ankle above the level of your heart (a few pillows) when at rest to reduce pain and swelling.

## 2024-03-10 NOTE — ED PROVIDER NOTES
ED Attending Attestation Note     Date of evaluation: 3/8/2024    This patient was seen by the advance practice provider.  I have seen and examined the patient, agree with the workup, evaluation, management and diagnosis. The care plan has been discussed.  My assessment reveals a 64-year-old male presenting to the emergency department with 3 days of right ankle pain, no identifiable inciting trauma.  No fevers, no nausea or vomiting.  Pain has progressed to the point that it is difficult for him to walk, although he is still able.  He has no previous history of gout.    On my exam the patient is afebrile, hemodynamically stable, in no acute distress.  He has some subtle swelling across the right ankle and right midfoot that is really only noticeable when [id number] compared to the left.  The right ankle is warmer, and there is some tenderness along the anterior aspect.  No appreciable redness.  DP pulses intact, sensation is intact to light touch throughout.    Roland Peres MD  [facility]  Emergency Medicine     Roland Peres MD  03/10/24 1257

## 2024-04-22 RX ORDER — METOPROLOL SUCCINATE 25 MG/1
TABLET, EXTENDED RELEASE ORAL
Qty: 30 TABLET | Refills: 0 | OUTPATIENT
Start: 2024-04-22

## 2024-04-22 RX ORDER — METOPROLOL SUCCINATE 25 MG/1
TABLET, EXTENDED RELEASE ORAL
Qty: 30 TABLET | Refills: 0 | Status: SHIPPED | OUTPATIENT
Start: 2024-04-22

## 2024-05-28 RX ORDER — AMLODIPINE BESYLATE 5 MG/1
TABLET ORAL
Qty: 90 TABLET | Refills: 0 | Status: SHIPPED | OUTPATIENT
Start: 2024-05-28

## 2024-05-28 RX ORDER — METOPROLOL SUCCINATE 25 MG/1
TABLET, EXTENDED RELEASE ORAL
Qty: 30 TABLET | Refills: 0 | Status: SHIPPED | OUTPATIENT
Start: 2024-05-28

## 2024-06-21 RX ORDER — METOPROLOL SUCCINATE 25 MG/1
TABLET, EXTENDED RELEASE ORAL
Qty: 30 TABLET | Refills: 0 | OUTPATIENT
Start: 2024-06-21

## 2024-06-24 RX ORDER — METOPROLOL SUCCINATE 25 MG/1
TABLET, EXTENDED RELEASE ORAL
Qty: 15 TABLET | Refills: 0 | Status: SHIPPED | OUTPATIENT
Start: 2024-06-24

## 2024-06-25 DIAGNOSIS — I25.10 CORONARY ARTERY DISEASE INVOLVING NATIVE CORONARY ARTERY OF NATIVE HEART WITHOUT ANGINA PECTORIS: ICD-10-CM

## 2024-06-25 DIAGNOSIS — I10 HTN (HYPERTENSION), BENIGN: ICD-10-CM

## 2024-06-25 DIAGNOSIS — E78.5 HYPERLIPIDEMIA, UNSPECIFIED HYPERLIPIDEMIA TYPE: ICD-10-CM

## 2024-06-25 RX ORDER — METOPROLOL SUCCINATE 25 MG/1
TABLET, EXTENDED RELEASE ORAL
Qty: 30 TABLET | Refills: 0 | OUTPATIENT
Start: 2024-06-25

## 2024-06-25 RX ORDER — AMLODIPINE BESYLATE 5 MG/1
TABLET ORAL
Qty: 90 TABLET | Refills: 0 | OUTPATIENT
Start: 2024-06-25

## 2024-06-27 RX ORDER — ROSUVASTATIN CALCIUM 20 MG/1
20 TABLET, COATED ORAL DAILY
Qty: 90 TABLET | Refills: 0 | Status: SHIPPED | OUTPATIENT
Start: 2024-06-27

## 2024-07-11 ENCOUNTER — OFFICE VISIT (OUTPATIENT)
Dept: CARDIOLOGY CLINIC | Age: 64
End: 2024-07-11
Payer: COMMERCIAL

## 2024-07-11 VITALS
WEIGHT: 196 LBS | HEART RATE: 71 BPM | DIASTOLIC BLOOD PRESSURE: 96 MMHG | BODY MASS INDEX: 26.58 KG/M2 | SYSTOLIC BLOOD PRESSURE: 148 MMHG

## 2024-07-11 DIAGNOSIS — I25.10 CORONARY ARTERY DISEASE INVOLVING NATIVE CORONARY ARTERY OF NATIVE HEART WITHOUT ANGINA PECTORIS: Primary | ICD-10-CM

## 2024-07-11 DIAGNOSIS — E78.2 MIXED HYPERLIPIDEMIA: ICD-10-CM

## 2024-07-11 DIAGNOSIS — I10 PRIMARY HYPERTENSION: ICD-10-CM

## 2024-07-11 PROCEDURE — 3077F SYST BP >= 140 MM HG: CPT | Performed by: NURSE PRACTITIONER

## 2024-07-11 PROCEDURE — 3080F DIAST BP >= 90 MM HG: CPT | Performed by: NURSE PRACTITIONER

## 2024-07-11 PROCEDURE — G8427 DOCREV CUR MEDS BY ELIG CLIN: HCPCS | Performed by: NURSE PRACTITIONER

## 2024-07-11 PROCEDURE — 1036F TOBACCO NON-USER: CPT | Performed by: NURSE PRACTITIONER

## 2024-07-11 PROCEDURE — 3017F COLORECTAL CA SCREEN DOC REV: CPT | Performed by: NURSE PRACTITIONER

## 2024-07-11 PROCEDURE — G8419 CALC BMI OUT NRM PARAM NOF/U: HCPCS | Performed by: NURSE PRACTITIONER

## 2024-07-11 PROCEDURE — 99215 OFFICE O/P EST HI 40 MIN: CPT | Performed by: NURSE PRACTITIONER

## 2024-07-11 PROCEDURE — 93000 ELECTROCARDIOGRAM COMPLETE: CPT | Performed by: NURSE PRACTITIONER

## 2024-07-11 RX ORDER — METOPROLOL SUCCINATE 25 MG/1
25 TABLET, EXTENDED RELEASE ORAL DAILY
Qty: 90 TABLET | Refills: 3 | Status: SHIPPED | OUTPATIENT
Start: 2024-07-11

## 2024-07-11 RX ORDER — AMLODIPINE BESYLATE 10 MG/1
10 TABLET ORAL DAILY
Qty: 90 TABLET | Refills: 3 | Status: SHIPPED | OUTPATIENT
Start: 2024-07-11

## 2024-07-11 RX ORDER — ROSUVASTATIN CALCIUM 20 MG/1
20 TABLET, COATED ORAL DAILY
Qty: 90 TABLET | Refills: 3 | Status: SHIPPED | OUTPATIENT
Start: 2024-07-11

## 2024-07-11 NOTE — PROGRESS NOTES
[facility] [place]  [address]  [phone]    CC CAD/HTN/HLD    HPI:  64 y.o. patient of Dr Rodriguez here for CAD, HTN, and HLD. He denies cp, sob, LH/dizziness, palpitations, syncope or falls. No weight gain, edema, orthopnea, PND, abdominal bloating or early satiety. No n/v/d, fever or Gi/ bleeding. Tolerating medications.     Works 3rd shift.     Past Medical History:   Diagnosis Date    CAD (coronary artery disease)     NSTEMI: POBA distal LCx with 1.5 mm Balloon. Vessels too small for stents 12/7/2018    HLD (hyperlipidemia)     HTN (hypertension), benign      No past surgical history on file.  No family history on file.  Social History     Tobacco Use    Smoking status: Former     Current packs/day: 1.00     Types: Cigarettes    Smokeless tobacco: Never    Tobacco comments:     started at age 20   Substance Use Topics    Alcohol use: Yes     Comment: weekends     Drug use: Yes     Types: Marijuana (Weed)     Comment: occ     Allergies:Egg-derived products    Review of Systems  All 12 point review of symptoms completed. Pertinent positives identified in the HPI, all other review of symptoms negative.    Physical Exam:  BP (!) 148/96   Pulse 71   Wt 88.9 kg (196 lb)   BMI 26.58 kg/m²    General (appearance):  No acute distress  Eyes: anicteric   Neck: soft, No JVD  Ears/Nose/Mouth/Thorat: No cyanosis  CV: RRR   Respiratory:  clear  GI: soft, non-tender, non-distended  Skin: Warm, dry. No rashes  Neuro/Psych: Alert and oriented x 3. Appropriate behavior  Ext:  No c/c. No edema  Pulses:  2+ radial     Weight  Wt Readings from Last 3 Encounters:   07/11/24 88.9 kg (196 lb)   05/08/23 100.7 kg (222 lb)   11/07/22 100.2 kg (221 lb)        CBC:   Lab Results   Component Value Date    WBC 7.2 03/08/2024    HGB 15.5 03/08/2024    HCT 45.6 03/08/2024    MCV 97.5 03/08/2024     03/08/2024     BMP:  Lab Results   Component Value Date    CREATININE 2.5 (H) 03/08/2024    BUN 27 (H)

## 2024-07-15 DIAGNOSIS — I25.10 CORONARY ARTERY DISEASE INVOLVING NATIVE CORONARY ARTERY OF NATIVE HEART WITHOUT ANGINA PECTORIS: ICD-10-CM

## 2024-07-15 DIAGNOSIS — E78.2 MIXED HYPERLIPIDEMIA: ICD-10-CM

## 2024-07-15 DIAGNOSIS — I10 PRIMARY HYPERTENSION: ICD-10-CM

## 2024-07-15 LAB
ALBUMIN SERPL-MCNC: 4.7 G/DL (ref 3.4–5)
ALBUMIN/GLOB SERPL: 1.7 {RATIO} (ref 1.1–2.2)
ALP SERPL-CCNC: 86 U/L (ref 40–129)
ALT SERPL-CCNC: 20 U/L (ref 10–40)
ANION GAP SERPL CALCULATED.3IONS-SCNC: 14 MMOL/L (ref 3–16)
AST SERPL-CCNC: 27 U/L (ref 15–37)
BILIRUB SERPL-MCNC: 0.3 MG/DL (ref 0–1)
BUN SERPL-MCNC: 38 MG/DL (ref 7–20)
CALCIUM SERPL-MCNC: 9.6 MG/DL (ref 8.3–10.6)
CHLORIDE SERPL-SCNC: 105 MMOL/L (ref 99–110)
CHOLEST SERPL-MCNC: 171 MG/DL (ref 0–199)
CO2 SERPL-SCNC: 22 MMOL/L (ref 21–32)
CREAT SERPL-MCNC: 2.6 MG/DL (ref 0.8–1.3)
GFR SERPLBLD CREATININE-BSD FMLA CKD-EPI: 27 ML/MIN/{1.73_M2}
GLUCOSE SERPL-MCNC: 84 MG/DL (ref 70–99)
HDLC SERPL-MCNC: 42 MG/DL (ref 40–60)
LDLC SERPL CALC-MCNC: 91 MG/DL
POTASSIUM SERPL-SCNC: 4.6 MMOL/L (ref 3.5–5.1)
PROT SERPL-MCNC: 7.5 G/DL (ref 6.4–8.2)
SODIUM SERPL-SCNC: 141 MMOL/L (ref 136–145)
TRIGL SERPL-MCNC: 189 MG/DL (ref 0–150)
VLDLC SERPL CALC-MCNC: 38 MG/DL

## 2024-08-20 ENCOUNTER — OFFICE VISIT (OUTPATIENT)
Dept: CARDIOLOGY CLINIC | Age: 64
End: 2024-08-20
Payer: COMMERCIAL

## 2024-08-20 VITALS
BODY MASS INDEX: 26.04 KG/M2 | WEIGHT: 192 LBS | DIASTOLIC BLOOD PRESSURE: 78 MMHG | OXYGEN SATURATION: 96 % | SYSTOLIC BLOOD PRESSURE: 140 MMHG | HEART RATE: 75 BPM

## 2024-08-20 DIAGNOSIS — I10 ESSENTIAL HYPERTENSION: ICD-10-CM

## 2024-08-20 DIAGNOSIS — N18.30 STAGE 3 CHRONIC KIDNEY DISEASE, UNSPECIFIED WHETHER STAGE 3A OR 3B CKD (HCC): ICD-10-CM

## 2024-08-20 DIAGNOSIS — I10 PRIMARY HYPERTENSION: Primary | ICD-10-CM

## 2024-08-20 PROCEDURE — G8427 DOCREV CUR MEDS BY ELIG CLIN: HCPCS | Performed by: NURSE PRACTITIONER

## 2024-08-20 PROCEDURE — 3077F SYST BP >= 140 MM HG: CPT | Performed by: NURSE PRACTITIONER

## 2024-08-20 PROCEDURE — G8419 CALC BMI OUT NRM PARAM NOF/U: HCPCS | Performed by: NURSE PRACTITIONER

## 2024-08-20 PROCEDURE — 99215 OFFICE O/P EST HI 40 MIN: CPT | Performed by: NURSE PRACTITIONER

## 2024-08-20 PROCEDURE — 3017F COLORECTAL CA SCREEN DOC REV: CPT | Performed by: NURSE PRACTITIONER

## 2024-08-20 PROCEDURE — 3078F DIAST BP <80 MM HG: CPT | Performed by: NURSE PRACTITIONER

## 2024-08-20 PROCEDURE — 1036F TOBACCO NON-USER: CPT | Performed by: NURSE PRACTITIONER

## 2024-08-20 RX ORDER — CARVEDILOL 12.5 MG/1
12.5 TABLET ORAL 2 TIMES DAILY
Qty: 60 TABLET | Refills: 5 | Status: SHIPPED | OUTPATIENT
Start: 2024-08-20

## 2024-08-20 NOTE — PROGRESS NOTES
[facility]     Outpatient Follow Up Note  Was pt of Dr Jennifer Branch RN, APRN,CNP CVNP      CHIEF COMPLAINT / HPI:  Ludwin Rush is 64 y.o. male who presents today with  CRI CAD, HTN, and HLD. He denies cp, sob, LH/dizziness, palpitations, syncope or falls. No weight gain, edema, orthopnea, PND, abdominal bloating or early satiety. No n/v/d, fever or Gi/ bleeding. Tolerating medications.     Interval history:  VSS wt stable no c/o cp SOB edema PND or FAVIAN  He had Norvasc at 10 mg due to high b/p on last visit he states  it made feel h/a and edema  sCr 2.6 increasing / recommend to see Dr Sandhu soon   Start coreg 12.5mg BID and  in 2-3 weeks /stop norvasc and toprol   Fu in 2-3 weeks     I spent a total of 50 minutes and greater than 50% of the time was spent counseling with patient  coordinating care regarding her diagnosis, reviewing labs, cardiac work up, treatments and plan of care.    Past Medical History:   Diagnosis Date    CAD (coronary artery disease)     NSTEMI: POBA distal LCx with 1.5 mm Balloon. Vessels too small for stents 12/7/2018    HLD (hyperlipidemia)     HTN (hypertension), benign      Social History:    Social History     Tobacco Use   Smoking Status Former    Current packs/day: 1.00    Types: Cigarettes   Smokeless Tobacco Never   Tobacco Comments    started at age 20     Current Medications:  Current Outpatient Medications   Medication Sig Dispense Refill    metoprolol succinate (TOPROL XL) 25 MG extended release tablet Take 1 tablet by mouth daily 90 tablet 3    rosuvastatin (CRESTOR) 20 MG tablet Take 1 tablet by mouth daily 90 tablet 3    clopidogrel (PLAVIX) 75 MG tablet Take 1 tablet by mouth once daily 90 tablet 3    acetaminophen (TYLENOL) 325 MG tablet Take 2 tablets by mouth every 6 hours as needed for Pain      amLODIPine (NORVASC) 10 MG tablet Take 1 tablet by mouth daily (Patient not taking: Reported on 8/20/2024) 90 tablet 3     No current

## 2024-09-11 ENCOUNTER — TELEPHONE (OUTPATIENT)
Dept: CARDIOLOGY CLINIC | Age: 64
End: 2024-09-11

## 2024-09-11 ENCOUNTER — TELEPHONE (OUTPATIENT)
Dept: ADMINISTRATIVE | Age: 64
End: 2024-09-11

## 2024-09-11 DIAGNOSIS — I25.10 CORONARY ARTERY DISEASE INVOLVING NATIVE CORONARY ARTERY OF NATIVE HEART WITHOUT ANGINA PECTORIS: ICD-10-CM

## 2024-09-11 DIAGNOSIS — E78.5 HYPERLIPIDEMIA, UNSPECIFIED HYPERLIPIDEMIA TYPE: ICD-10-CM

## 2024-09-11 DIAGNOSIS — I10 PRIMARY HYPERTENSION: ICD-10-CM

## 2024-09-11 DIAGNOSIS — E78.2 MIXED HYPERLIPIDEMIA: ICD-10-CM

## 2024-09-11 RX ORDER — ROSUVASTATIN CALCIUM 20 MG/1
20 TABLET, COATED ORAL DAILY
Qty: 90 TABLET | Refills: 3 | Status: SHIPPED | OUTPATIENT
Start: 2024-09-11

## 2024-09-11 RX ORDER — ROSUVASTATIN CALCIUM 20 MG/1
20 TABLET, COATED ORAL DAILY
Qty: 30 TABLET | Refills: 5 | Status: SHIPPED | OUTPATIENT
Start: 2024-09-11

## 2024-09-12 ENCOUNTER — TELEPHONE (OUTPATIENT)
Dept: ADMINISTRATIVE | Age: 64
End: 2024-09-12

## 2024-09-12 NOTE — TELEPHONE ENCOUNTER
Submitted PA for ROSUVASTATIN  Via CM Key: [id number]  STATUS: PENDING    Follow up done daily; if no decision with in three days we will refax.  If another three days goes by with no decision will call the insurance for status.

## 2024-09-12 NOTE — TELEPHONE ENCOUNTER
Insurance sent a fax asking what pt has tried and failed?  I see atorvastatin, but I do not see any other meds pt has tried and failed.

## 2024-09-23 DIAGNOSIS — I10 PRIMARY HYPERTENSION: ICD-10-CM

## 2024-09-23 DIAGNOSIS — I10 ESSENTIAL HYPERTENSION: ICD-10-CM

## 2024-09-23 DIAGNOSIS — N18.30 STAGE 3 CHRONIC KIDNEY DISEASE, UNSPECIFIED WHETHER STAGE 3A OR 3B CKD (HCC): ICD-10-CM

## 2024-09-23 LAB
25(OH)D3 SERPL-MCNC: 33 NG/ML
ALBUMIN SERPL-MCNC: 4.1 G/DL (ref 3.4–5)
ALBUMIN/GLOB SERPL: 2 {RATIO} (ref 1.1–2.2)
ALP SERPL-CCNC: 76 U/L (ref 40–129)
ALT SERPL-CCNC: 24 U/L (ref 10–40)
ANION GAP SERPL CALCULATED.3IONS-SCNC: 10 MMOL/L (ref 3–16)
AST SERPL-CCNC: 25 U/L (ref 15–37)
BILIRUB DIRECT SERPL-MCNC: <0.1 MG/DL (ref 0–0.3)
BILIRUB INDIRECT SERPL-MCNC: ABNORMAL MG/DL (ref 0–1)
BILIRUB SERPL-MCNC: <0.2 MG/DL (ref 0–1)
BUN SERPL-MCNC: 30 MG/DL (ref 7–20)
CALCIUM SERPL-MCNC: 9.6 MG/DL (ref 8.3–10.6)
CHLORIDE SERPL-SCNC: 108 MMOL/L (ref 99–110)
CHOLEST SERPL-MCNC: 143 MG/DL (ref 0–199)
CO2 SERPL-SCNC: 24 MMOL/L (ref 21–32)
CREAT SERPL-MCNC: 2.7 MG/DL (ref 0.8–1.3)
DEPRECATED RDW RBC AUTO: 14.1 % (ref 12.4–15.4)
GFR SERPLBLD CREATININE-BSD FMLA CKD-EPI: 26 ML/MIN/{1.73_M2}
GLUCOSE SERPL-MCNC: 100 MG/DL (ref 70–99)
HCT VFR BLD AUTO: 44.8 % (ref 40.5–52.5)
HDLC SERPL-MCNC: 35 MG/DL (ref 40–60)
HGB BLD-MCNC: 14.9 G/DL (ref 13.5–17.5)
LDLC SERPL CALC-MCNC: 81 MG/DL
MAGNESIUM SERPL-MCNC: 2.11 MG/DL (ref 1.8–2.4)
MCH RBC QN AUTO: 32.2 PG (ref 26–34)
MCHC RBC AUTO-ENTMCNC: 33.2 G/DL (ref 31–36)
MCV RBC AUTO: 97 FL (ref 80–100)
PLATELET # BLD AUTO: 227 K/UL (ref 135–450)
PMV BLD AUTO: 7.8 FL (ref 5–10.5)
POTASSIUM SERPL-SCNC: 4.4 MMOL/L (ref 3.5–5.1)
PROT SERPL-MCNC: 6.2 G/DL (ref 6.4–8.2)
RBC # BLD AUTO: 4.62 M/UL (ref 4.2–5.9)
SODIUM SERPL-SCNC: 142 MMOL/L (ref 136–145)
TRIGL SERPL-MCNC: 135 MG/DL (ref 0–150)
TSH SERPL DL<=0.005 MIU/L-ACNC: 0.34 UIU/ML (ref 0.27–4.2)
VLDLC SERPL CALC-MCNC: 27 MG/DL
WBC # BLD AUTO: 7.3 K/UL (ref 4–11)

## 2024-10-21 RX ORDER — CLOPIDOGREL BISULFATE 75 MG/1
TABLET ORAL
Qty: 90 TABLET | Refills: 3 | Status: SHIPPED | OUTPATIENT
Start: 2024-10-21

## 2024-10-21 NOTE — TELEPHONE ENCOUNTER
Requested Prescriptions     Pending Prescriptions Disp Refills    clopidogrel (PLAVIX) 75 MG tablet [Pharmacy Med Name: Clopidogrel Bisulfate 75 MG Oral Tablet] 90 tablet 3     Sig: Take 1 tablet by mouth once daily            Checked Correct Pharmacy: Yes    Any changes since last refill? No     Number: 90    Refills: 3    Last Office Visit: 8/20/2024     Next Office Visit: 2/20/2025     Last Refill: 10/24/2023    Last Labs: 09/23/2024

## 2024-10-25 NOTE — PATIENT INSTRUCTIONS
Thank you for choosing [facility] for your cardiac care.    During your visit today, we reviewed and confirmed your cardiac medications along with  medication prescribed by your other healthcare team members. Please be sure to discuss any  changes to medication with your providers.    Please bring a list of ALL medications (or the bottles) with you to EVERY appointment.  Also include vitamins and over-the-counter medications.    If you need refills for any cardiac medications, please call your pharmacy and they will reach out to us electronically.    Did your provider order testing today? If yes, then you will receive your results in three  possible ways. You can receive a "[employer] message, a phone call, or letter in the mail. Please  note, if you are an active "[employer] user, some of your testing will be available within 1-2 days.    Finally, please know that it is good for your heart to exercise and follow a healthy, low-fat diet  as advised by your physician and health care providers.    If you are experiencing a medical emergency, please call 911 immediately.    It's easy to register for a "[employer] account if you don't already have one. With a "[employer]  account you can manage your health record, view test results, schedule appointments and  more.     Dr. Damian's clinical staff can be reached at the following phone number: [phone]    If any cardiac testing is ordered, please contact central scheduling at [phone] to get your test scheduled.

## 2024-10-25 NOTE — PROGRESS NOTES
[facility]     Outpatient Cardiology         Patient Name:  Ludwin Rush  Primary Care Physician: No primary care provider on file.  10/28/24     Assessment & Plan    Assessment / Plan:     CAD status post PCI to left circumflex in 2018.  No active angina symptoms - we will continue secondary prevention measures  Essential hypertension-not at goal but acceptable readings.  Continue Procardia.  Continue Coreg.  Hyperlipidemia-last LDL of 81.  Continue Crestor.  Low-cholesterol diet.  CKD 4-management per nephrology.  Last creatinine of 2.7  Stable from cardiac standpoint.  Follow-up in 6 months or sooner if needed              Chief Complaint:     Chief Complaint   Patient presents with    Follow-up       History of Present Illness:       HPI     Ludwin Rush is a 64 y.o. male with PMH of CAD, HTN and CKD stage three here for a follow up.  Previously seen by Dr Rodriguez.     Today he reports he is doing well.     Patient denies any chest pain, shortness of breath, palpitations, presyncope or syncope. No TIA. No claudication. No recent hospitalizations    Reviewed medications, tests and labs    PMH  Past Medical History:   Diagnosis Date    CAD (coronary artery disease)     NSTEMI: POBA distal LCx with 1.5 mm Balloon. Vessels too small for stents 12/7/2018    HLD (hyperlipidemia)     HTN (hypertension), benign        PSH  No past surgical history on file.     Social HIstory  Social History     Tobacco Use    Smoking status: Former     Current packs/day: 1.00     Types: Cigarettes    Smokeless tobacco: Never    Tobacco comments:     started at age 20   Substance Use Topics    Alcohol use: Yes     Comment: weekends     Drug use: Yes     Types: Marijuana (Weed)     Comment: occ       Family History  No family history on file.    Allergies   Allergies   Allergen Reactions    Egg-Derived Products Other (See Comments)     stomach cramps  Pt says he isn't allergic to anymore        Medications:

## 2024-10-28 ENCOUNTER — OFFICE VISIT (OUTPATIENT)
Dept: CARDIOLOGY CLINIC | Age: 64
End: 2024-10-28
Payer: COMMERCIAL

## 2024-10-28 VITALS
SYSTOLIC BLOOD PRESSURE: 148 MMHG | DIASTOLIC BLOOD PRESSURE: 84 MMHG | WEIGHT: 195.4 LBS | OXYGEN SATURATION: 97 % | BODY MASS INDEX: 26.5 KG/M2 | HEART RATE: 76 BPM

## 2024-10-28 DIAGNOSIS — E78.2 MIXED HYPERLIPIDEMIA: ICD-10-CM

## 2024-10-28 DIAGNOSIS — I10 PRIMARY HYPERTENSION: ICD-10-CM

## 2024-10-28 DIAGNOSIS — I25.10 CORONARY ARTERY DISEASE INVOLVING NATIVE CORONARY ARTERY OF NATIVE HEART WITHOUT ANGINA PECTORIS: Primary | ICD-10-CM

## 2024-10-28 PROCEDURE — 99214 OFFICE O/P EST MOD 30 MIN: CPT | Performed by: INTERNAL MEDICINE

## 2024-10-28 PROCEDURE — 3017F COLORECTAL CA SCREEN DOC REV: CPT | Performed by: INTERNAL MEDICINE

## 2024-10-28 PROCEDURE — G8484 FLU IMMUNIZE NO ADMIN: HCPCS | Performed by: INTERNAL MEDICINE

## 2024-10-28 PROCEDURE — G8419 CALC BMI OUT NRM PARAM NOF/U: HCPCS | Performed by: INTERNAL MEDICINE

## 2024-10-28 PROCEDURE — 1036F TOBACCO NON-USER: CPT | Performed by: INTERNAL MEDICINE

## 2024-10-28 PROCEDURE — 3079F DIAST BP 80-89 MM HG: CPT | Performed by: INTERNAL MEDICINE

## 2024-10-28 PROCEDURE — G8427 DOCREV CUR MEDS BY ELIG CLIN: HCPCS | Performed by: INTERNAL MEDICINE

## 2024-10-28 PROCEDURE — 3077F SYST BP >= 140 MM HG: CPT | Performed by: INTERNAL MEDICINE

## 2025-04-28 ENCOUNTER — OFFICE VISIT (OUTPATIENT)
Dept: CARDIOLOGY CLINIC | Age: 65
End: 2025-04-28
Payer: COMMERCIAL

## 2025-04-28 VITALS
SYSTOLIC BLOOD PRESSURE: 144 MMHG | BODY MASS INDEX: 24.95 KG/M2 | OXYGEN SATURATION: 96 % | WEIGHT: 184 LBS | DIASTOLIC BLOOD PRESSURE: 90 MMHG | HEART RATE: 73 BPM

## 2025-04-28 DIAGNOSIS — I25.10 CORONARY ARTERY DISEASE INVOLVING NATIVE CORONARY ARTERY OF NATIVE HEART WITHOUT ANGINA PECTORIS: Primary | ICD-10-CM

## 2025-04-28 DIAGNOSIS — E78.2 MIXED HYPERLIPIDEMIA: ICD-10-CM

## 2025-04-28 DIAGNOSIS — I10 PRIMARY HYPERTENSION: ICD-10-CM

## 2025-04-28 PROCEDURE — 1036F TOBACCO NON-USER: CPT | Performed by: INTERNAL MEDICINE

## 2025-04-28 PROCEDURE — G8420 CALC BMI NORM PARAMETERS: HCPCS | Performed by: INTERNAL MEDICINE

## 2025-04-28 PROCEDURE — G2211 COMPLEX E/M VISIT ADD ON: HCPCS | Performed by: INTERNAL MEDICINE

## 2025-04-28 PROCEDURE — G8427 DOCREV CUR MEDS BY ELIG CLIN: HCPCS | Performed by: INTERNAL MEDICINE

## 2025-04-28 PROCEDURE — 3077F SYST BP >= 140 MM HG: CPT | Performed by: INTERNAL MEDICINE

## 2025-04-28 PROCEDURE — 99214 OFFICE O/P EST MOD 30 MIN: CPT | Performed by: INTERNAL MEDICINE

## 2025-04-28 PROCEDURE — 3080F DIAST BP >= 90 MM HG: CPT | Performed by: INTERNAL MEDICINE

## 2025-04-28 PROCEDURE — 3017F COLORECTAL CA SCREEN DOC REV: CPT | Performed by: INTERNAL MEDICINE

## 2025-04-28 NOTE — PROGRESS NOTES
[facility]     Outpatient Cardiology         Patient Name:  Ludwin Rush  Primary Care Physician: No primary care provider on file.  04/26/25     Assessment & Plan    Assessment / Plan:     CAD status post PCI to left circumflex in 2018.  No active angina symptoms - we will continue secondary prevention measures  Essential hypertension-not at goal but acceptable readings.  Continue Procardia.  Continue Coreg.  Hyperlipidemia-last LDL of 81.  Continue Crestor.  Low-cholesterol diet.  CKD 4-management per nephrology.  Last creatinine of 2.7  Stable from cardiac standpoint.  Follow-up in 6 months or sooner if needed              Chief Complaint:     No chief complaint on file.      History of Present Illness:       HPI     Ludwin Rush is a 65 y.o. male with PMH of CAD, HTN and CKD stage three here for a follow up.  Previously seen by Dr Rodriguez.     Today         Patient denies any chest pain, shortness of breath, palpitations, presyncope or syncope. No TIA. No claudication. No recent hospitalizations    Reviewed medications, tests and labs    PMH  Past Medical History:   Diagnosis Date    CAD (coronary artery disease)     NSTEMI: POBA distal LCx with 1.5 mm Balloon. Vessels too small for stents 12/7/2018    HLD (hyperlipidemia)     HTN (hypertension), benign        PSH  No past surgical history on file.     Social HIstory  Social History     Tobacco Use    Smoking status: Former     Current packs/day: 1.00     Types: Cigarettes    Smokeless tobacco: Never    Tobacco comments:     started at age 20   Substance Use Topics    Alcohol use: Yes     Comment: weekends     Drug use: Yes     Types: Marijuana (Weed)     Comment: occ       Family History  No family history on file.    Allergies   Allergies   Allergen Reactions    Egg-Derived Products Other (See Comments)     stomach cramps  Pt says he isn't allergic to anymore        Medications:     Home Medications:  Were reviewed and are listed 
25 09/23/2024    ALT 24 09/23/2024    LABGLOM 30 (A) 03/31/2025    GFRAA 39 (A) 04/29/2022    AGRATIO 2.0 09/23/2024    GLOB 2.4 08/25/2020         Lab Results   Component Value Date    CHOL 143 09/23/2024    CHOL 171 07/15/2024    CHOL 198 04/29/2022     Lab Results   Component Value Date    TRIG 135 09/23/2024    TRIG 189 (H) 07/15/2024    TRIG 332 (H) 04/29/2022     Lab Results   Component Value Date    HDL 35 (L) 09/23/2024    HDL 42 07/15/2024    HDL 32 (L) 04/29/2022     No components found for: \"LDLCHOLESTEROL\", \"LDLCALC\"  Lab Results   Component Value Date    VLDL 27 09/23/2024    VLDL 38 07/15/2024    VLDL see below 04/29/2022     No results found for: \"CHOLHDLRATIO\"    Lab Results   Component Value Date    INR 0.97 12/07/2018    PROTIME 11.1 12/07/2018       The ASCVD Risk score (Manpreet GAONA, et al., 2019) failed to calculate for the following reasons:    Risk score cannot be calculated because patient has a medical history suggesting prior/existing ASCVD      Imaging:     Last ECG (if available, Personally interpreted):    Last Monitor/Holter (if available):    Last Stress (if available):    Last Cath (if available):12/7/18  INDICATION:  This patient presented to the hospital yesterday evening  with chest pain.  His troponins trended up from 0.08 to 2.2.  His EKG  was nondiagnostic for MI, but he developed ST-segment changes in the  inferior leads.  For this reason, he presents for angiography.  He has  class III angina.  He is on heparin drip.     PROCEDURE IN DETAIL:  Prepped and draped in a sterile manner, locally  anesthetized with 2% lidocaine right, femoral artery area.  A 6-Cambodian  sheath was placed in retrograde manner, right femoral artery over  guidewire.  The 5-Cambodian JL-4, JR-4, and pigtail catheters were used  during the diagnostic procedure.  All were aspirated and flushed prior  to use.  All catheters were advanced under fluoroscopic guidance over  the guidewire and retracted over the

## 2025-04-28 NOTE — PATIENT INSTRUCTIONS
Thank you for choosing [facility] for your cardiac care.    During your visit today, we reviewed and confirmed your cardiac medications along with  medication prescribed by your other healthcare team members. Please be sure to discuss any  changes to medication with your providers.    Please bring a list of ALL medications (or the bottles) with you to EVERY appointment.  Also include vitamins and over-the-counter medications.    If you need refills for any cardiac medications, please call your pharmacy and they will reach out to us electronically.    Did your provider order testing today? If yes, then you will receive your results in three  possible ways. You can receive a [employer] message, a phone call, or letter in the mail. Please  note, if you are an active [employer] user, some of your testing will be available within 1-2 days.    Finally, please know that it is good for your heart to exercise and follow a healthy, low-fat diet  as advised by your physician and health care providers.    If you are experiencing a medical emergency, please call 911 immediately.    It's easy to register for a [employer] account if you don't already have one. With a [employer]  account you can manage your health record, view test results, schedule appointments and  more.     Dr. Damian's clinical staff can be reached at the following phone number: [phone]    If any cardiac testing is ordered, please contact central scheduling at [phone] to get your test scheduled.

## 2025-05-13 ENCOUNTER — HOSPITAL ENCOUNTER (EMERGENCY)
Age: 65
Discharge: HOME | End: 2025-05-13
Attending: STUDENT IN AN ORGANIZED HEALTH CARE EDUCATION/TRAINING PROGRAM
Payer: COMMERCIAL

## 2025-05-13 VITALS
HEIGHT: 72 IN | TEMPERATURE: 98 F | HEART RATE: 68 BPM | BODY MASS INDEX: 25.15 KG/M2 | RESPIRATION RATE: 18 BRPM | SYSTOLIC BLOOD PRESSURE: 156 MMHG | DIASTOLIC BLOOD PRESSURE: 99 MMHG | OXYGEN SATURATION: 98 % | WEIGHT: 185.7 LBS

## 2025-05-13 DIAGNOSIS — S46.812A STRAIN OF LEFT TRAPEZIUS MUSCLE, INITIAL ENCOUNTER: Primary | ICD-10-CM

## 2025-05-13 DIAGNOSIS — M62.838 SPASM OF MUSCLE: ICD-10-CM

## 2025-05-13 PROCEDURE — 20552 NJX 1/MLT TRIGGER POINT 1/2: CPT

## 2025-05-13 PROCEDURE — 99283 EMERGENCY DEPT VISIT LOW MDM: CPT

## 2025-05-13 PROCEDURE — 6360000002 HC RX W HCPCS: Performed by: STUDENT IN AN ORGANIZED HEALTH CARE EDUCATION/TRAINING PROGRAM

## 2025-05-13 PROCEDURE — 6370000000 HC RX 637 (ALT 250 FOR IP): Performed by: STUDENT IN AN ORGANIZED HEALTH CARE EDUCATION/TRAINING PROGRAM

## 2025-05-13 RX ORDER — METHOCARBAMOL 500 MG/1
500 TABLET, FILM COATED ORAL 3 TIMES DAILY
Qty: 21 TABLET | Refills: 0 | Status: SHIPPED | OUTPATIENT
Start: 2025-05-13 | End: 2025-05-20

## 2025-05-13 RX ORDER — METHOCARBAMOL 500 MG/1
750 TABLET, FILM COATED ORAL ONCE
Status: COMPLETED | OUTPATIENT
Start: 2025-05-13 | End: 2025-05-13

## 2025-05-13 RX ORDER — LIDOCAINE 50 MG/G
1 PATCH TOPICAL DAILY
Qty: 30 PATCH | Refills: 0 | Status: SHIPPED | OUTPATIENT
Start: 2025-05-13

## 2025-05-13 RX ORDER — LIDOCAINE HYDROCHLORIDE 10 MG/ML
5 INJECTION, SOLUTION INFILTRATION; PERINEURAL ONCE
Status: COMPLETED | OUTPATIENT
Start: 2025-05-13 | End: 2025-05-13

## 2025-05-13 RX ORDER — ACETAMINOPHEN 500 MG
1000 TABLET ORAL
Status: COMPLETED | OUTPATIENT
Start: 2025-05-13 | End: 2025-05-13

## 2025-05-13 RX ADMIN — LIDOCAINE HYDROCHLORIDE 5 ML: 10 INJECTION, SOLUTION INFILTRATION; PERINEURAL at 07:47

## 2025-05-13 RX ADMIN — ACETAMINOPHEN 1000 MG: 500 TABLET ORAL at 07:45

## 2025-05-13 RX ADMIN — METHOCARBAMOL 750 MG: 500 TABLET ORAL at 07:45

## 2025-05-13 ASSESSMENT — ENCOUNTER SYMPTOMS
SHORTNESS OF BREATH: 0
NAUSEA: 0
COUGH: 0
VOMITING: 0

## 2025-05-13 NOTE — DISCHARGE INSTRUCTIONS
You have a spasm in the muscle on the left side of your neck/shoulder.  You can take Tylenol up to 4 times daily.  You can also use the muscle relaxer and lidocaine patch as prescribed.  You can use heating pads and massage as well.  Please establish care with a primary care doctor and consider getting established with physical therapy.

## 2025-05-13 NOTE — ED PROVIDER NOTES
THE [facility]  EMERGENCY DEPARTMENT ENCOUNTER          ATTENDING PHYSICIAN NOTE       Date of evaluation: 5/13/2025    Chief Complaint     Shoulder Pain (L should/neck pain)      History of Present Illness     Ludwin Rush is a 65 y.o. male who presents reporting left sided neck/shoulder pain.  Patient says he has a history of this happening a couple of years ago and says it feels similar.  He reports pain at the posterior shoulder/lower neck that started 3 to 4 days ago.  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  The pain does not radiate down the arm and he has no numbness or tingling.  He has no chest pain, shortness of breath or other cardiac symptoms.  He was at urgent care couple of days ago and given a prescription for a muscle relaxer which he says has not been helping.  He is also tried Tylenol.  He is not able to take ibuprofen.  He does not have a PCP.    ASSESSMENT / PLAN  (MEDICAL DECISION MAKING)     INITIAL VITALS: BP: (!) 165/100, Temp: 98 °F (36.7 °C), Pulse: 68, Respirations: 18, SpO2: 99 %      Ludwin Rush is a 65 y.o. male who is presenting for left-sided posterior shoulder/lower neck pain.  Symptoms were triggered at work with overuse type mechanism.  Pain is very much musculoskeletal.  He has focal tenderness in the upper fibers of the trapezius.  Seems consistent with a trigger point.  He is having no cervical radiculopathy symptoms.  He has no weakness in his arms and has a negative Spurling's maneuver.  He is having no cardiac pain and despite his cardiac history I have low suspicion that this is an anginal equivalent.  Discussed trigger point injection performed in the ED today.  Will put him on lidocaine patches, new muscle relaxer and Tylenol.  He is unable to take NSAIDs.  Will refer him to a PCP so he can get started in physical therapy.  Also discussed

## 2025-05-21 RX ORDER — CARVEDILOL 12.5 MG/1
12.5 TABLET ORAL 2 TIMES DAILY
Qty: 60 TABLET | Refills: 3 | Status: SHIPPED | OUTPATIENT
Start: 2025-05-21

## 2025-06-11 ENCOUNTER — HOSPITAL ENCOUNTER (EMERGENCY)
Age: 65
Discharge: HOME | End: 2025-06-11
Attending: EMERGENCY MEDICINE
Payer: COMMERCIAL

## 2025-06-11 VITALS
RESPIRATION RATE: 18 BRPM | BODY MASS INDEX: 25.56 KG/M2 | HEIGHT: 72 IN | WEIGHT: 188.7 LBS | DIASTOLIC BLOOD PRESSURE: 103 MMHG | HEART RATE: 70 BPM | OXYGEN SATURATION: 98 % | TEMPERATURE: 98.3 F | SYSTOLIC BLOOD PRESSURE: 148 MMHG

## 2025-06-11 DIAGNOSIS — M62.838 SPASM OF MUSCLE: Primary | ICD-10-CM

## 2025-06-11 PROCEDURE — 99283 EMERGENCY DEPT VISIT LOW MDM: CPT

## 2025-06-11 RX ORDER — LIDOCAINE 50 MG/G
1 PATCH TOPICAL DAILY
Qty: 30 PATCH | Refills: 0 | Status: SHIPPED | OUTPATIENT
Start: 2025-06-11

## 2025-06-11 RX ORDER — METHOCARBAMOL 750 MG/1
750 TABLET, FILM COATED ORAL 3 TIMES DAILY PRN
Qty: 30 TABLET | Refills: 0 | Status: SHIPPED | OUTPATIENT
Start: 2025-06-11 | End: 2025-06-21

## 2025-06-11 ASSESSMENT — PAIN DESCRIPTION - ORIENTATION: ORIENTATION: LEFT

## 2025-06-11 ASSESSMENT — LIFESTYLE VARIABLES
HOW MANY STANDARD DRINKS CONTAINING ALCOHOL DO YOU HAVE ON A TYPICAL DAY: PATIENT DOES NOT DRINK
HOW OFTEN DO YOU HAVE A DRINK CONTAINING ALCOHOL: NEVER

## 2025-06-11 ASSESSMENT — PAIN - FUNCTIONAL ASSESSMENT
PAIN_FUNCTIONAL_ASSESSMENT: 0-10
PAIN_FUNCTIONAL_ASSESSMENT: 0-10

## 2025-06-11 ASSESSMENT — PAIN DESCRIPTION - LOCATION: LOCATION: SHOULDER

## 2025-06-11 ASSESSMENT — PAIN SCALES - GENERAL: PAINLEVEL_OUTOF10: 9

## 2025-06-11 NOTE — DISCHARGE INSTRUCTIONS
Take medications as prescribed.  You may find some benefit from over-the-counter lidocaine patches with [employer].    Come back to the emergency department for any new or worsening symptoms, any other urgent concerns.    You can call the [facility] medicine clinic to help establish ED care

## 2025-06-11 NOTE — ED PROVIDER NOTES
THE [facility]  EMERGENCY DEPARTMENT ENCOUNTER          ATTENDING PHYSICIAN NOTE       Date of evaluation: 6/11/2025    Chief Complaint     Shoulder Pain (Pt presents to the ED due to left shoulder pain. 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 Pt states he took tylenol last night and it helped. Pt states he has a history of shoulder pain. Pt states that the pills that we gave him last time caused more pain. )      History of Present Illness     Ludwin Rush is a 65 y.o. male who presents to the emergency department with continued left trapezius pain.  He is right-hand dominant.  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  This causes moderate constant cramping discomfort that is worse with certain movements.  No numbness tingling weakness in the arm.  No trauma.  No anterior chest symptoms.  No pain or swelling in his legs or shortness of breath.  No fevers or chills.    Was seen in the emergency department roughly a month ago with similar complaints.  He had some improvement with symptomatic medications prescribed at that time, but is currently out.  He has not been able to establish primary care, primarily because of his work hours.    Review of Systems     Pertinent positives and negatives as described in the history of present illness.    Past Medical, Surgical, Family, and Social History     He has a past medical history of CAD (coronary artery disease), HLD (hyperlipidemia), and HTN (hypertension), benign.  He has no past surgical history on file.  His family history is not on file.  He reports that he has been smoking cigars. He has never used smokeless tobacco. He reports current alcohol use. He reports current drug use. Drug: Marijuana (Weed).    Medications     Previous Medications    ACETAMINOPHEN (TYLENOL) 325 MG TABLET    Take 2 tablets by mouth every 6 hours as needed for Pain    CARVEDILOL

## 2025-06-11 NOTE — ED NOTES
Patient prepared for and ready to be discharged. Patient discharged at this time in no acute distress after verbalizing understanding of discharge instructions. Patient left after receiving After Visit Summary instructions.        Cami Cagle RN  06/11/25 0919